# Patient Record
Sex: FEMALE | Race: BLACK OR AFRICAN AMERICAN | Employment: OTHER | URBAN - METROPOLITAN AREA
[De-identification: names, ages, dates, MRNs, and addresses within clinical notes are randomized per-mention and may not be internally consistent; named-entity substitution may affect disease eponyms.]

---

## 2020-11-06 ENCOUNTER — OFFICE VISIT (OUTPATIENT)
Dept: ENDOCRINOLOGY | Age: 65
End: 2020-11-06
Payer: MEDICARE

## 2020-11-06 VITALS
RESPIRATION RATE: 18 BRPM | HEART RATE: 73 BPM | SYSTOLIC BLOOD PRESSURE: 119 MMHG | HEIGHT: 69 IN | TEMPERATURE: 98.2 F | BODY MASS INDEX: 29.68 KG/M2 | OXYGEN SATURATION: 98 % | WEIGHT: 200.4 LBS | DIASTOLIC BLOOD PRESSURE: 82 MMHG

## 2020-11-06 DIAGNOSIS — E04.2 MULTINODULAR GOITER: ICD-10-CM

## 2020-11-06 DIAGNOSIS — E06.3 HYPOTHYROIDISM DUE TO HASHIMOTO'S THYROIDITIS: ICD-10-CM

## 2020-11-06 DIAGNOSIS — I10 ESSENTIAL HYPERTENSION: ICD-10-CM

## 2020-11-06 DIAGNOSIS — E03.8 HYPOTHYROIDISM DUE TO HASHIMOTO'S THYROIDITIS: ICD-10-CM

## 2020-11-06 DIAGNOSIS — E11.65 UNCONTROLLED TYPE 2 DIABETES MELLITUS WITH HYPERGLYCEMIA (HCC): Primary | ICD-10-CM

## 2020-11-06 PROCEDURE — 1101F PT FALLS ASSESS-DOCD LE1/YR: CPT | Performed by: INTERNAL MEDICINE

## 2020-11-06 PROCEDURE — 3046F HEMOGLOBIN A1C LEVEL >9.0%: CPT | Performed by: INTERNAL MEDICINE

## 2020-11-06 PROCEDURE — G8427 DOCREV CUR MEDS BY ELIG CLIN: HCPCS | Performed by: INTERNAL MEDICINE

## 2020-11-06 PROCEDURE — 2022F DILAT RTA XM EVC RTNOPTHY: CPT | Performed by: INTERNAL MEDICINE

## 2020-11-06 PROCEDURE — 99205 OFFICE O/P NEW HI 60 MIN: CPT | Performed by: INTERNAL MEDICINE

## 2020-11-06 PROCEDURE — G8536 NO DOC ELDER MAL SCRN: HCPCS | Performed by: INTERNAL MEDICINE

## 2020-11-06 PROCEDURE — G8400 PT W/DXA NO RESULTS DOC: HCPCS | Performed by: INTERNAL MEDICINE

## 2020-11-06 PROCEDURE — 1090F PRES/ABSN URINE INCON ASSESS: CPT | Performed by: INTERNAL MEDICINE

## 2020-11-06 PROCEDURE — G8510 SCR DEP NEG, NO PLAN REQD: HCPCS | Performed by: INTERNAL MEDICINE

## 2020-11-06 PROCEDURE — 3017F COLORECTAL CA SCREEN DOC REV: CPT | Performed by: INTERNAL MEDICINE

## 2020-11-06 PROCEDURE — G8419 CALC BMI OUT NRM PARAM NOF/U: HCPCS | Performed by: INTERNAL MEDICINE

## 2020-11-06 RX ORDER — METOPROLOL SUCCINATE 100 MG/1
TABLET, EXTENDED RELEASE ORAL DAILY
COMMUNITY

## 2020-11-06 RX ORDER — DILTIAZEM HYDROCHLORIDE 240 MG/1
CAPSULE, COATED, EXTENDED RELEASE ORAL DAILY
COMMUNITY

## 2020-11-06 RX ORDER — TIMOLOL MALEATE 6.8 MG/ML
1 SOLUTION/ DROPS OPHTHALMIC DAILY
COMMUNITY

## 2020-11-06 RX ORDER — PANTOPRAZOLE SODIUM 40 MG/1
40 TABLET, DELAYED RELEASE ORAL DAILY
COMMUNITY

## 2020-11-06 RX ORDER — LOSARTAN POTASSIUM AND HYDROCHLOROTHIAZIDE 12.5; 1 MG/1; MG/1
1 TABLET ORAL DAILY
COMMUNITY

## 2020-11-06 RX ORDER — LATANOPROST 50 UG/ML
1 SOLUTION/ DROPS OPHTHALMIC
COMMUNITY

## 2020-11-06 RX ORDER — FLUTICASONE PROPIONATE 50 MCG
2 SPRAY, SUSPENSION (ML) NASAL DAILY
COMMUNITY

## 2020-11-06 RX ORDER — ROSUVASTATIN CALCIUM 10 MG/1
10 TABLET, COATED ORAL
COMMUNITY

## 2020-11-06 RX ORDER — LEVOTHYROXINE SODIUM 75 UG/1
TABLET ORAL
Qty: 30 TAB | Refills: 6 | Status: SHIPPED | OUTPATIENT
Start: 2020-11-06 | End: 2021-03-09 | Stop reason: SDUPTHER

## 2020-11-06 RX ORDER — LEVOCETIRIZINE DIHYDROCHLORIDE 5 MG/1
TABLET, FILM COATED ORAL
COMMUNITY

## 2020-11-06 RX ORDER — METFORMIN HYDROCHLORIDE 1000 MG/1
1000 TABLET ORAL DAILY
COMMUNITY

## 2020-11-06 NOTE — LETTER
11/8/20 Patient: Radames Gilliam YOB: 1955 Date of Visit: 11/6/2020 Robert Barrios MD 
[address] [place] [address] VIA In Basket Dear Robert Barrios MD, Thank you for referring Ms. Robert Ortiz to [id number] [place] [address] for evaluation. My notes for this consultation are attached. If you have questions, please do not hesitate to call me. I look forward to following your patient along with you. Sincerely, Robbin Zuleta MD

## 2020-11-06 NOTE — PROGRESS NOTES
Care Diabetes [facility] Endocrinology  Lety Menchaca MD, Rachna Whitman is a 76 y.o. female presents today as a new DM Patient for MNg      HPI    Patient here for initial visit of Type 2 diabetes mellitus   And     Hypothyroidism   Referred : by Dr Rae Zaragoza     H/o diabetes for 10  years     Current A1C is <7 %  and symptoms/problems include none     Current diabetic medications include metformin   Patient has always been that conscious, and exercising by which she is kept her diabetes in very good control. She got the log and the sugars are good    She has had multinodular goiter for several years and she had seen that it was growing and Dr. Sakina Zaragoza had her go for biopsy which turned out to be negative for cancer    Patient denies any discomfort or dysphagia from goiter        No past medical history on file. No past surgical history on file. Current Outpatient Medications   Medication Sig    glucose blood VI test strips (OneTouch Ultra Blue Test Strip) strip OneTouch Ultra Blue Test Strip   USE AS DIRECTED (E11.9) ONCE DAILY IN VITRO 90 DAYS    pantoprazole (PROTONIX) 40 mg tablet Take 40 mg by mouth daily.  rosuvastatin (Crestor) 10 mg tablet Take 10 mg by mouth nightly.  dilTIAZem ER (Cartia XT) 240 mg capsule Take  by mouth daily.  losartan-hydroCHLOROthiazide (HYZAAR) 100-12.5 mg per tablet Take 1 Tab by mouth daily.  levocetirizine (Xyzal) 5 mg tablet Take  by mouth.  metoprolol succinate (TOPROL-XL) 100 mg tablet Take  by mouth daily.  latanoprost (XALATAN) 0.005 % ophthalmic solution Administer 1 Drop to both eyes nightly.  timoloL maleate 0.5 % drpd ophthalmic solution Administer 1 Drop to both eyes daily.  multivitamin, tx-iron-ca-min (THERA-M w/ IRON) 9 mg iron-400 mcg tab tablet Take 1 Tab by mouth daily.  fluticasone propionate (FLONASE) 50 mcg/actuation nasal spray 2 Sprays by Both Nostrils route daily.      No current facility-administered medications for this visit. Review of Systems   Constitutional: Negative. HENT: Negative. Eyes: Negative. Respiratory: Negative. Cardiovascular: Negative. Gastrointestinal: Negative. Genitourinary: Negative. Musculoskeletal: Negative. Skin: Negative. Neurological: Negative. Endo/Heme/Allergies: Negative. Psychiatric/Behavioral: Negative. Vitals:    11/06/20 1124   BP: 119/82   BP 1 Location: Right arm   BP Patient Position: Sitting   Pulse: 73   Resp: 18   Temp: 98.2 °F (36.8 °C)   TempSrc: Oral   SpO2: 98%   Weight: 200 lb 6.4 oz (90.9 kg)   Height: 5' 9\" (1.753 m)        Physical Exam  Constitutional:       Appearance: She is well-developed. HENT:      Head: Normocephalic and atraumatic. Eyes:      Conjunctiva/sclera: Conjunctivae normal.      Pupils: Pupils are equal, round, and reactive to light. Neck:      Musculoskeletal: Normal range of motion and neck supple. Comments: Thyromegaly present   Cardiovascular:      Rate and Rhythm: Normal rate and regular rhythm. Pulmonary:      Effort: Pulmonary effort is normal.      Breath sounds: Normal breath sounds. Abdominal:      General: Bowel sounds are normal.      Palpations: Abdomen is soft. Musculoskeletal: Normal range of motion. Skin:     General: Skin is warm and dry. Comments: Has acanthosis on neck and under arms   Neurological:      Mental Status: She is alert and oriented to person, place, and time. Deep Tendon Reflexes: Reflexes are normal and symmetric. [x] Recent labs have been reviewed from referring provider. [] Recent labs were not available at time of visit. 1. Type 2 DM uncontrolled :  a1c is in always good range from 6.1% to 6.2%    Reviewed the glucose log : good range   She will stay on Metformin  Patient is advised to check blood sugars 1 times daily by rotation method.   reviewed medications and side effects in detail  lab results and schedule of future lab studies reviewed with patient      2. Hypoglycemia :  Educated on treating the hypoglycemia. 3. HTN : continue diltiazem, toprol xl  And  hyzaar . Patient is educated about importance of compliance with anti-hypertensives especially ARB/ACEI    4. Dyslipidemia : continue crestor . Patient is educated about benefits and adverse effects of statins and explained how benefits outweigh risk. 5. use of aspirin to prevent MI and TIA's discussed      6. Sleep apnea - on cpap       7. H/o sciatica - not on meds       7. Hypothyroidism  : On 75 mcg  Synthroid   Euthyroid      8.  MNG  - march ultrasound 2020 showed presence of  Isthmus nodule  -  3.9   Cmm  By 2.6 cm by 1.4 cm; has grown, but could be technical and this was compared to October 18, 2019    Right inf pole nodule - 1 cm by 0.7 cm unchanged in size moderately suspicious  Right mid  Nodule measures 2.6 cm by 2.1 cm by 1.9 cm and it increased in size significantly as the prior exam it measured 1.7 cm x 1.7 cm x 1.8 cm      left upper pole nodule 1cm by 0.8 cm by 0.7 cm       November 2019 the FNA is found to be benign from isthmic thyroid nodule  May 2020, the FNA is  negative for cancer from right thyroid nodule        -So the goiter requires supervision as patient is asymptomatic at this time    > 50 % of time is spent on counseling   Patient voiced understanding her plan of care

## 2020-11-06 NOTE — PROGRESS NOTES
Wt Readings from Last 3 Encounters:   11/06/20 200 lb 6.4 oz (90.9 kg)     Temp Readings from Last 3 Encounters:   11/06/20 98.2 °F (36.8 °C) (Oral)     BP Readings from Last 3 Encounters:   11/06/20 119/82     Pulse Readings from Last 3 Encounters:   11/06/20 73     No results found for: HBA1C, HGBE8, PLE2OZSR, FDE2ZLGR

## 2020-11-06 NOTE — PATIENT INSTRUCTIONS
[facility] water Do not skip meals Do not eat in between meals Reduce carbs- pasta, rice, potatoes, bread Do not drink juices or sodas, even they are calorie zero or diet drinks Do not eat peanut butter Do not eat sugar free cookies and cakes Do not eat peaches, oranges, pineapples, raisins, grapes , canteloupe , honey dew and fruit medleys 
 
-------------------------------------------------------------------------------- Stay on metformin  1 gm one pill  twice a day with meals  
 
 
-------------------------------------------------------------------------- Synthroid 75  mcg  A day, on empty stomach with water only, no other meds or food or drinks   For next half hour Take any kind of vitamins, calcium, iron   Pills  4 hours later

## 2021-03-03 LAB
ALBUMIN SERPL-MCNC: 3.8 G/DL (ref 3.8–4.8)
ALBUMIN/CREAT UR: 6 MG/G CREAT (ref 0–29)
ALBUMIN/GLOB SERPL: 1.3 {RATIO} (ref 1.2–2.2)
ALP SERPL-CCNC: 93 IU/L (ref 39–117)
ALT SERPL-CCNC: 17 IU/L (ref 0–32)
AST SERPL-CCNC: 13 IU/L (ref 0–40)
BILIRUB SERPL-MCNC: 0.4 MG/DL (ref 0–1.2)
BUN SERPL-MCNC: 11 MG/DL (ref 8–27)
BUN/CREAT SERPL: 17 (ref 12–28)
CALCIUM SERPL-MCNC: 9.3 MG/DL (ref 8.7–10.3)
CHLORIDE SERPL-SCNC: 103 MMOL/L (ref 96–106)
CHOLEST SERPL-MCNC: 134 MG/DL (ref 100–199)
CO2 SERPL-SCNC: 27 MMOL/L (ref 20–29)
CREAT SERPL-MCNC: 0.66 MG/DL (ref 0.57–1)
CREAT UR-MCNC: 99.7 MG/DL
EST. AVERAGE GLUCOSE BLD GHB EST-MCNC: 134 MG/DL
GLOBULIN SER CALC-MCNC: 3 G/DL (ref 1.5–4.5)
GLUCOSE SERPL-MCNC: 93 MG/DL (ref 65–99)
HBA1C MFR BLD: 6.3 % (ref 4.8–5.6)
HDLC SERPL-MCNC: 49 MG/DL
IMP & REVIEW OF LAB RESULTS: NORMAL
LDLC SERPL CALC-MCNC: 69 MG/DL (ref 0–99)
Lab: NORMAL
MICROALBUMIN UR-MCNC: 5.9 UG/ML
POTASSIUM SERPL-SCNC: 4.3 MMOL/L (ref 3.5–5.2)
PROT SERPL-MCNC: 6.8 G/DL (ref 6–8.5)
SODIUM SERPL-SCNC: 142 MMOL/L (ref 134–144)
TRIGL SERPL-MCNC: 80 MG/DL (ref 0–149)
TSH SERPL DL<=0.005 MIU/L-ACNC: 0.73 UIU/ML (ref 0.45–4.5)
VLDLC SERPL CALC-MCNC: 16 MG/DL (ref 5–40)

## 2021-03-09 ENCOUNTER — OFFICE VISIT (OUTPATIENT)
Dept: ENDOCRINOLOGY | Age: 66
End: 2021-03-09
Payer: MEDICARE

## 2021-03-09 VITALS
TEMPERATURE: 98.2 F | WEIGHT: 200 LBS | SYSTOLIC BLOOD PRESSURE: 139 MMHG | RESPIRATION RATE: 18 BRPM | HEART RATE: 69 BPM | OXYGEN SATURATION: 95 % | BODY MASS INDEX: 29.53 KG/M2 | DIASTOLIC BLOOD PRESSURE: 80 MMHG

## 2021-03-09 DIAGNOSIS — E03.8 HYPOTHYROIDISM DUE TO HASHIMOTO'S THYROIDITIS: ICD-10-CM

## 2021-03-09 DIAGNOSIS — E04.2 MULTINODULAR GOITER: Primary | ICD-10-CM

## 2021-03-09 DIAGNOSIS — E11.65 UNCONTROLLED TYPE 2 DIABETES MELLITUS WITH HYPERGLYCEMIA (HCC): ICD-10-CM

## 2021-03-09 DIAGNOSIS — E06.3 HYPOTHYROIDISM DUE TO HASHIMOTO'S THYROIDITIS: ICD-10-CM

## 2021-03-09 PROCEDURE — G8536 NO DOC ELDER MAL SCRN: HCPCS | Performed by: INTERNAL MEDICINE

## 2021-03-09 PROCEDURE — 1101F PT FALLS ASSESS-DOCD LE1/YR: CPT | Performed by: INTERNAL MEDICINE

## 2021-03-09 PROCEDURE — 3044F HG A1C LEVEL LT 7.0%: CPT | Performed by: INTERNAL MEDICINE

## 2021-03-09 PROCEDURE — G8400 PT W/DXA NO RESULTS DOC: HCPCS | Performed by: INTERNAL MEDICINE

## 2021-03-09 PROCEDURE — 2022F DILAT RTA XM EVC RTNOPTHY: CPT | Performed by: INTERNAL MEDICINE

## 2021-03-09 PROCEDURE — 3017F COLORECTAL CA SCREEN DOC REV: CPT | Performed by: INTERNAL MEDICINE

## 2021-03-09 PROCEDURE — G8419 CALC BMI OUT NRM PARAM NOF/U: HCPCS | Performed by: INTERNAL MEDICINE

## 2021-03-09 PROCEDURE — G8427 DOCREV CUR MEDS BY ELIG CLIN: HCPCS | Performed by: INTERNAL MEDICINE

## 2021-03-09 PROCEDURE — 1090F PRES/ABSN URINE INCON ASSESS: CPT | Performed by: INTERNAL MEDICINE

## 2021-03-09 PROCEDURE — 99214 OFFICE O/P EST MOD 30 MIN: CPT | Performed by: INTERNAL MEDICINE

## 2021-03-09 PROCEDURE — G8510 SCR DEP NEG, NO PLAN REQD: HCPCS | Performed by: INTERNAL MEDICINE

## 2021-03-09 RX ORDER — LEVOTHYROXINE SODIUM 75 UG/1
TABLET ORAL
Qty: 90 TAB | Refills: 3 | Status: SHIPPED | OUTPATIENT
Start: 2021-03-09 | End: 2021-10-11 | Stop reason: SDUPTHER

## 2021-03-09 RX ORDER — GUAIFENESIN 100 MG/5ML
81 LIQUID (ML) ORAL DAILY
COMMUNITY

## 2021-03-09 RX ORDER — LANOLIN ALCOHOL/MO/W.PET/CERES
1000 CREAM (GRAM) TOPICAL DAILY
COMMUNITY

## 2021-03-09 RX ORDER — ASCORBIC ACID 500 MG
TABLET ORAL
COMMUNITY

## 2021-03-09 NOTE — LETTER
3/9/2021 Patient: Luis Enrique Rosales YOB: 1955 Date of Visit: 3/9/2021 Bruce Caceres MD 
[facility] [address] [facility] [address] Via Fax: [phone] Dear Bruce Caceres MD, Thank you for referring Ms. Caren Wyatt to [address] [address] for evaluation. My notes for this consultation are attached. If you have questions, please do not hesitate to call me. I look forward to following your patient along with you. Sincerely, Marcial Barajas MD

## 2021-03-09 NOTE — PROGRESS NOTES
1. Have you been to the ER, urgent care clinic since your last visit? [facility]-February, 2021-Covid symptoms  Hospitalized since your last visit? No    2. Have you seen or consulted any other health care providers outside of the [address] [place] [address] since your last visit? Include any pap smears or colon screening.  Yes When: [facility]-February 25, 2021-Covid re check     Wt Readings from Last 3 Encounters:   03/09/21 200 lb (90.7 kg)   11/06/20 200 lb 6.4 oz (90.9 kg)     Temp Readings from Last 3 Encounters:   03/09/21 98.2 °F (36.8 °C)   11/06/20 98.2 °F (36.8 °C) (Oral)     BP Readings from Last 3 Encounters:   03/09/21 139/80   11/06/20 119/82     Pulse Readings from Last 3 Encounters:   03/09/21 69   11/06/20 73     Lab Results   Component Value Date/Time    Hemoglobin A1c 6.3 (H) 03/01/2021 07:42 AM

## 2021-03-09 NOTE — PROGRESS NOTES
Care Diabetes and Endocrinology  Greta Price MD, Christie Garza is a 76 y.o. female presents today as a new DM Patient for MNg      HPI    Patient here for  FIRST FOLLOW UP AFTER  initial visit of Type 2 diabetes mellitus   And     Hypothyroidism   From November 2020       pcp has decreased metformin to one pill a day         Nov 2020   Referred : by Dr Ayaan Talbert   H/o diabetes for 10  years   Current A1C is <7 %  and symptoms/problems include none   Current diabetic medications include metformin   Patient has always been that conscious, and exercising by which she is kept her diabetes in very good control. She got the log and the sugars are good  She has had multinodular goiter for several years and she had seen that it was growing and Dr. Malena Talbert had her go for biopsy which turned out to be negative for cancer  Patient denies any discomfort or dysphagia from goiter      Vitals:    03/09/21 0903   BP: 139/80   Pulse: 69   Resp: 18   Temp: 98.2 °F (36.8 °C)   SpO2: 95%   Weight: 200 lb (90.7 kg)          Review of Systems   None       Physical Exam   Constitutional: She is oriented to person, place, and time. She appears well-developed and well-nourished. Neck:  thyromegaly present. Cardiovascular: Normal rate, regular rhythm   Pulmonary/Chest: Breath sounds normal.   Psychiatric: She has a normal mood and affect. LABS :   Lab Results   Component Value Date/Time    Hemoglobin A1c 6.3 (H) 03/01/2021 07:42 AM    Glucose 93 03/01/2021 07:42 AM    Microalb/Creat ratio (ug/mg creat.) 6 03/01/2021 07:42 AM    LDL, calculated 69 03/01/2021 07:42 AM    Creatinine 0.66 03/01/2021 07:42 AM      Lab Results   Component Value Date/Time    Cholesterol, total 134 03/01/2021 07:42 AM    HDL Cholesterol 49 03/01/2021 07:42 AM    LDL, calculated 69 03/01/2021 07:42 AM    Triglyceride 80 03/01/2021 07:42 AM     Lab Results   Component Value Date/Time    ALT (SGPT) 17 03/01/2021 07:42 AM    Alk. phosphatase 93 03/01/2021 07:42 AM    Bilirubin, total 0.4 03/01/2021 07:42 AM    Albumin 3.8 03/01/2021 07:42 AM    Protein, total 6.8 03/01/2021 07:42 AM     Lab Results   Component Value Date/Time    GFR est non-AA 92 03/01/2021 07:42 AM    GFR est  03/01/2021 07:42 AM    Creatinine 0.66 03/01/2021 07:42 AM    BUN 11 03/01/2021 07:42 AM    Sodium 142 03/01/2021 07:42 AM    Potassium 4.3 03/01/2021 07:42 AM    Chloride 103 03/01/2021 07:42 AM    CO2 27 03/01/2021 07:42 AM           Assessment and plan     1. Type 2 DM uncontrolled :  a1c is  6.3 %    FROM [facility] 2021   COMPARED TO IN THE PAST, SHE ALWAYS HAD  good range OF A1C  - 6.1% to 6.2%  BUT SHE IS ON  Metformin    METFORMIN being adjusted by pcp. Patient is advised to check blood sugars 1 times daily by rotation method. 2. HTN : controlled,  continue diltiazem, toprol xl  And  hyzaar . 3. Dyslipidemia : continue crestor     4. hypothyroidism  : On 75 mcg  Synthroid   Euthyroid       5.  MNG  - march ultrasound 2020 :   Isthmus nodule  -  3.9   Cm  By 2.6 cm by 1.4 cm; has grown, but could be technical and this was compared to October 18, 2019    Right inf pole nodule - 1 cm by 0.7 cm unchanged in size moderately suspicious  Right mid  Nodule measures 2.6 cm by 2.1 cm by 1.9 cm and it increased in size significantly as the prior exam it measured 1.7 cm x 1.7 cm x 1.8 cm .  left upper pole nodule 1cm by 0.8 cm by 0.7 cm       November 2019 the FNA is found to be benign from isthmic thyroid nodule  May 2020, the FNA is  negative for cancer from right thyroid nodule      -So the goiter requires supervision as patient is asymptomatic at this time  March 2021  - ordering the ultrasound         Reviewed results with patient and discussed the labs being ordered today/bnv  Patient voiced understanding of plan of care

## 2021-03-09 NOTE — PATIENT INSTRUCTIONS
SPECIFIC INSTRUCTIONS BELOW       Synthroid 75  mcg  A day, on empty stomach with water only, no other meds or food or drinks   For next half hour     Take any kind of vitamins, calcium, iron   Pills  4 hours later            PAY ATTENTION TO THESE GENERAL INSTRUCTIONS     - HEALTH MAINTENANCE IS NOT GOING TO BE UP TO DATE ON YOUR AVS- PLEASE IGNORE   - YOUR MED LIST IS NOT UP TO DATE AS SOME CHANGES ARE BEING MADE AFTER THE VISIT - FOLLOW SPECIFIC INSTRUCTIONS ABOVE     Results     *Normal results will not be notified by a phone call starting January 1 2021   *If you have an upcoming visit, the results will be discussed at the visit   *Please sign up for MY CHART if you want access to your lab and test results  *Abnormal results which require immediate attention will be notified by phone call   *Abnormal results which do not require immediate assistance will be notified in 1-2 weeks       Refills    -    have your pharmacy send us a refill request  Phone calls  -  Allow  24 hrs.  for non-urgent calls to be returned  Prior authorization - It may take 2-4 weeks to process  Forms  -  FMLA, DMV etc., will take up to 2 weeks to process  Cancellations - please notify the office 2 days in advance   Samples  - will only be dispensed at visits     --------------------------------------------------------------------------------------------

## 2021-03-18 ENCOUNTER — HOSPITAL ENCOUNTER (OUTPATIENT)
Dept: ULTRASOUND IMAGING | Age: 66
Discharge: HOME | End: 2021-03-18
Attending: INTERNAL MEDICINE
Payer: MEDICARE

## 2021-03-18 DIAGNOSIS — E04.2 MULTINODULAR GOITER: ICD-10-CM

## 2021-03-18 PROCEDURE — 76536 US EXAM OF HEAD AND NECK: CPT

## 2021-03-22 NOTE — PROGRESS NOTES
This is the baseline ultrasound done  at [facility] .  Will follow - meaning , will order ultrasound in 6 months to one year for follow up on growth

## 2021-05-11 ENCOUNTER — HOSPITAL ENCOUNTER (OUTPATIENT)
Dept: GENERAL RADIOLOGY | Age: 66
Discharge: HOME | End: 2021-05-11
Payer: MEDICARE

## 2021-05-11 ENCOUNTER — TRANSCRIBE ORDER (OUTPATIENT)
Dept: REGISTRATION | Age: 66
End: 2021-05-11

## 2021-05-11 DIAGNOSIS — R06.02 SHORTNESS OF BREATH: Primary | ICD-10-CM

## 2021-05-11 DIAGNOSIS — R06.02 SHORTNESS OF BREATH: ICD-10-CM

## 2021-05-11 PROCEDURE — 71046 X-RAY EXAM CHEST 2 VIEWS: CPT

## 2021-09-25 LAB — TSH SERPL DL<=0.005 MIU/L-ACNC: 0.48 UIU/ML (ref 0.45–4.5)

## 2021-10-11 ENCOUNTER — OFFICE VISIT (OUTPATIENT)
Dept: ENDOCRINOLOGY | Age: 66
End: 2021-10-11
Payer: MEDICARE

## 2021-10-11 VITALS
BODY MASS INDEX: 31.07 KG/M2 | TEMPERATURE: 97.2 F | OXYGEN SATURATION: 96 % | HEART RATE: 60 BPM | SYSTOLIC BLOOD PRESSURE: 141 MMHG | WEIGHT: 209.8 LBS | RESPIRATION RATE: 18 BRPM | DIASTOLIC BLOOD PRESSURE: 65 MMHG | HEIGHT: 69 IN

## 2021-10-11 DIAGNOSIS — E03.8 HYPOTHYROIDISM DUE TO HASHIMOTO'S THYROIDITIS: Primary | ICD-10-CM

## 2021-10-11 DIAGNOSIS — E04.2 MULTINODULAR GOITER: ICD-10-CM

## 2021-10-11 DIAGNOSIS — E11.65 UNCONTROLLED TYPE 2 DIABETES MELLITUS WITH HYPERGLYCEMIA (HCC): ICD-10-CM

## 2021-10-11 DIAGNOSIS — E06.3 HYPOTHYROIDISM DUE TO HASHIMOTO'S THYROIDITIS: Primary | ICD-10-CM

## 2021-10-11 LAB — HBA1C MFR BLD HPLC: 5.9 %

## 2021-10-11 PROCEDURE — G8427 DOCREV CUR MEDS BY ELIG CLIN: HCPCS | Performed by: INTERNAL MEDICINE

## 2021-10-11 PROCEDURE — G8400 PT W/DXA NO RESULTS DOC: HCPCS | Performed by: INTERNAL MEDICINE

## 2021-10-11 PROCEDURE — 99214 OFFICE O/P EST MOD 30 MIN: CPT | Performed by: INTERNAL MEDICINE

## 2021-10-11 PROCEDURE — 1090F PRES/ABSN URINE INCON ASSESS: CPT | Performed by: INTERNAL MEDICINE

## 2021-10-11 PROCEDURE — 83036 HEMOGLOBIN GLYCOSYLATED A1C: CPT | Performed by: INTERNAL MEDICINE

## 2021-10-11 PROCEDURE — G8417 CALC BMI ABV UP PARAM F/U: HCPCS | Performed by: INTERNAL MEDICINE

## 2021-10-11 PROCEDURE — 1101F PT FALLS ASSESS-DOCD LE1/YR: CPT | Performed by: INTERNAL MEDICINE

## 2021-10-11 PROCEDURE — 2022F DILAT RTA XM EVC RTNOPTHY: CPT | Performed by: INTERNAL MEDICINE

## 2021-10-11 PROCEDURE — 3044F HG A1C LEVEL LT 7.0%: CPT | Performed by: INTERNAL MEDICINE

## 2021-10-11 PROCEDURE — G8536 NO DOC ELDER MAL SCRN: HCPCS | Performed by: INTERNAL MEDICINE

## 2021-10-11 PROCEDURE — G8510 SCR DEP NEG, NO PLAN REQD: HCPCS | Performed by: INTERNAL MEDICINE

## 2021-10-11 PROCEDURE — 3017F COLORECTAL CA SCREEN DOC REV: CPT | Performed by: INTERNAL MEDICINE

## 2021-10-11 RX ORDER — LEVOTHYROXINE SODIUM 75 UG/1
TABLET ORAL
Qty: 90 TABLET | Refills: 3 | Status: SHIPPED | OUTPATIENT
Start: 2021-10-11 | End: 2022-10-10 | Stop reason: SDUPTHER

## 2021-10-11 RX ORDER — TURMERIC 400 MG
1 CAPSULE ORAL DAILY
COMMUNITY

## 2021-10-11 RX ORDER — FLUTICASONE PROPIONATE AND SALMETEROL 250; 50 UG/1; UG/1
POWDER RESPIRATORY (INHALATION)
COMMUNITY
Start: 2021-07-13

## 2021-10-11 NOTE — PROGRESS NOTES
Care Diabetes and Endocrinology  [facility] Current  MD, Mishel Al is a 78 y.o. female presents today as a follow up        Patient here for  AFTER  Last  visit  for Type 2 diabetes mellitus   And     Hypothyroidism   And MNG  From [facility] 2021       Compliant with meds   Has no symptoms           March 2021     pcp has decreased metformin to one pill a day         Nov 2020   Referred : by Dr Arnel Mcneal   H/o diabetes for 10  years   Current A1C is <7 %  and symptoms/problems include none   Current diabetic medications include metformin   Patient has always been that conscious, and exercising by which she is kept her diabetes in very good control. She got the log and the sugars are good  She has had multinodular goiter for several years and she had seen that it was growing and Dr. Elin Mcneal had her go for biopsy which turned out to be negative for cancer  Patient denies any discomfort or dysphagia from goiter      Vitals:    10/11/21 0845   BP: (!) 141/65   BP 1 Location: Left upper arm   BP Patient Position: Sitting   BP Cuff Size: Large adult   Pulse: 60   Temp: 97.2 °F (36.2 °C)   TempSrc: Temporal   Resp: 18   Height: 5' 9\" (1.753 m)   Weight: 209 lb 12.8 oz (95.2 kg)   SpO2: 96%          Review of Systems   None       Physical Exam   Constitutional: She is oriented to person, place, and time. She appears well-developed and well-nourished. Neck:  thyromegaly present. Psychiatric: She has a normal mood and affect.            LABS :   Lab Results   Component Value Date/Time    Hemoglobin A1c 6.3 (H) 03/01/2021 07:42 AM    Glucose 93 03/01/2021 07:42 AM    Microalb/Creat ratio (ug/mg creat.) 6 03/01/2021 07:42 AM    LDL, calculated 69 03/01/2021 07:42 AM    Creatinine 0.66 03/01/2021 07:42 AM      Lab Results   Component Value Date/Time    Cholesterol, total 134 03/01/2021 07:42 AM    HDL Cholesterol 49 03/01/2021 07:42 AM    LDL, calculated 69 03/01/2021 07:42 AM    Triglyceride 80 03/01/2021 07:42 AM Lab Results   Component Value Date/Time    ALT (SGPT) 17 03/01/2021 07:42 AM    Alk. phosphatase 93 03/01/2021 07:42 AM    Bilirubin, total 0.4 03/01/2021 07:42 AM    Albumin 3.8 03/01/2021 07:42 AM    Protein, total 6.8 03/01/2021 07:42 AM     Lab Results   Component Value Date/Time    GFR est non-AA 92 03/01/2021 07:42 AM    GFR est  03/01/2021 07:42 AM    Creatinine 0.66 03/01/2021 07:42 AM    BUN 11 03/01/2021 07:42 AM    Sodium 142 03/01/2021 07:42 AM    Potassium 4.3 03/01/2021 07:42 AM    Chloride 103 03/01/2021 07:42 AM    CO2 27 03/01/2021 07:42 AM           Assessment and plan     1.  hypothyroidism  : On 75 mcg  Synthroid   Euthyroid - sept 2021       2. MNG  - march ultrasound 2020 :   Isthmus nodule  -  3.9   Cm  By 2.6 cm by 1.4 cm; has grown, but could be technical and this was compared to October 18, 2019    Right inf pole nodule - 1 cm by 0.7 cm unchanged in size moderately suspicious  Right mid  Nodule measures 2.6 cm by 2.1 cm by 1.9 cm and it increased in size significantly as the prior exam it measured 1.7 cm x 1.7 cm x 1.8 cm .  left upper pole nodule 1cm by 0.8 cm by 0.7 cm       November 2019 the FNA is found to be benign from isthmic thyroid nodule  May 2020, the FNA is  negative for cancer from right thyroid nodule      -So the goiter requires supervision as patient is asymptomatic at this time  March 2021  -  ultrasound  : Right lobe hypoechoic partially indistinct nodule 1.8 x  1.8 x 1.8 cm. TI-RADS  Category 4. Right lobe hypoechoic nodule 1.0 x 0.7 x 0.6 cm. TI-RADS Category 4. Isthmus heterogeneous hypoechoic and isoechoic nodule 3.1 x 1.5 x 2.9  cm.  TI-RADS Category 3-4.      3. Type 2 DM uncontrolled :  a1c is   5.9 %    From  Today by POC oct 2021       Compared to   6.3 %    FROM [facility] 2021   COMPARED TO IN THE PAST, SHE ALWAYS HAD  good range OF A1C  - 6.1% to 6.2%  BUT SHE IS ON  Metformin    METFORMIN being adjusted by pcp.   Patient is advised to check blood sugars 1 times daily by rotation method. 4. HTN : controlled,  continue diltiazem, toprol xl  And  hyzaar . 5. Dyslipidemia : continue crestor         6.  Cad - she f/u with Dr. Madeline Alcaraz  -   On diltiazem and hyzaar and toprol xl       F/u in 1 year       Reviewed results with patient and discussed the labs being ordered today/bnv  Patient voiced understanding of plan of care

## 2021-10-11 NOTE — LETTER
10/11/2021    Patient: Clau Tenorio   YOB: 1955   Date of Visit: 10/11/2021     [facility]ick William MD  [facility]  [address] [address]  Via Fax: [phone]    Dear Vick William MD,      Thank you for referring Ms. Hari Lamb to [address] [place] [address] for evaluation. My notes for this consultation are attached. If you have questions, please do not hesitate to call me. I look forward to following your patient along with you.       Sincerely,    Elly Isaacs MD

## 2021-10-11 NOTE — PATIENT INSTRUCTIONS
SPECIFIC INSTRUCTIONS BELOW       Synthroid 75  mcg  A day, on empty stomach with water only, no other meds or food or drinks   For next half hour     Take any kind of vitamins, calcium, iron   Pills  4 hours later      -------------PAY ATTENTION TO THESE GENERAL INSTRUCTIONS -----------------      - The medications prescribed at this visit will not be available at pharmacy until 6 pm       - YOUR MED LIST IS NOT UP TO DATE AS SOME CHANGES ARE BEING MADE AFTER THE VISIT - FOLLOW SPECIFIC INSTRUCTIONS  ABOVE     -ANY tests other than blood work, which you opt to do  outside the  [facility] facilities, you are responsible for prior authorizations if  required    - [address] UP TO DATE ON YOUR AVS- PLEASE IGNORE     Results     *Normal results will not be notified by a phone call starting January 1 2021   *If you have an upcoming visit, the results will be discussed at the visit   *Please sign up for MY CHART if you want access to your lab and test results  *Abnormal results which require immediate attention will be notified by phone call   *Abnormal results which do not require immediate assistance will be notified in 1-2 weeks       Refills    -    have your pharmacy send us a refill request . Refills are done max for one year and a visit is a must before refills are extended    Follow up appointments -  highly encourage you to make it when you are checking out. We can accommodate you into the schedule based on your clinical situation, but not for extending refills beyond a year. Labs are important to give refills and is important to get labs before the visit     Phone calls  -  Allow  24 hrs.  for non-urgent calls to be returned  Prior authorization - It may take 2-4 weeks to process  Forms  -  FMLA, DMV etc., will take up to 2 weeks to process  Cancellations - please notify the office 2 days in advance   Samples  - will only be dispensed at visits       If not showing for the appointments and cancelling appointments within 24 hours are kept track of and three  of such situations in  two consecutive years will likely be considered for termination from the practice    -------------------------------------------------------------------------------------------------------------------

## 2022-10-04 LAB
ALBUMIN SERPL-MCNC: 4.4 G/DL (ref 3.8–4.8)
ALBUMIN/CREAT UR: 10 MG/G CREAT (ref 0–29)
ALBUMIN/GLOB SERPL: 1.8 {RATIO} (ref 1.2–2.2)
ALP SERPL-CCNC: 105 IU/L (ref 44–121)
ALT SERPL-CCNC: 15 IU/L (ref 0–32)
AST SERPL-CCNC: 11 IU/L (ref 0–40)
BILIRUB SERPL-MCNC: 0.4 MG/DL (ref 0–1.2)
BUN SERPL-MCNC: 8 MG/DL (ref 8–27)
BUN/CREAT SERPL: 14 (ref 12–28)
CALCIUM SERPL-MCNC: 9.6 MG/DL (ref 8.7–10.3)
CHLORIDE SERPL-SCNC: 105 MMOL/L (ref 96–106)
CHOLEST SERPL-MCNC: 124 MG/DL (ref 100–199)
CO2 SERPL-SCNC: 28 MMOL/L (ref 20–29)
CREAT SERPL-MCNC: 0.56 MG/DL (ref 0.57–1)
CREAT UR-MCNC: 235.5 MG/DL
EGFR: 99 ML/MIN/1.73
EST. AVERAGE GLUCOSE BLD GHB EST-MCNC: 134 MG/DL
GLOBULIN SER CALC-MCNC: 2.5 G/DL (ref 1.5–4.5)
GLUCOSE SERPL-MCNC: 110 MG/DL (ref 70–99)
HBA1C MFR BLD: 6.3 % (ref 4.8–5.6)
HDLC SERPL-MCNC: 46 MG/DL
IMP & REVIEW OF LAB RESULTS: NORMAL
LDLC SERPL CALC-MCNC: 61 MG/DL (ref 0–99)
MICROALBUMIN UR-MCNC: 23.1 UG/ML
POTASSIUM SERPL-SCNC: 4.6 MMOL/L (ref 3.5–5.2)
PROT SERPL-MCNC: 6.9 G/DL (ref 6–8.5)
SODIUM SERPL-SCNC: 144 MMOL/L (ref 134–144)
TRIGL SERPL-MCNC: 86 MG/DL (ref 0–149)
TSH SERPL DL<=0.005 MIU/L-ACNC: 0.76 UIU/ML (ref 0.45–4.5)
VLDLC SERPL CALC-MCNC: 17 MG/DL (ref 5–40)

## 2022-10-10 ENCOUNTER — OFFICE VISIT (OUTPATIENT)
Dept: ENDOCRINOLOGY | Age: 67
End: 2022-10-10
Payer: MEDICARE

## 2022-10-10 VITALS
HEART RATE: 60 BPM | HEIGHT: 69 IN | BODY MASS INDEX: 30.98 KG/M2 | DIASTOLIC BLOOD PRESSURE: 80 MMHG | RESPIRATION RATE: 18 BRPM | SYSTOLIC BLOOD PRESSURE: 143 MMHG | OXYGEN SATURATION: 96 % | WEIGHT: 209.2 LBS | TEMPERATURE: 97.3 F

## 2022-10-10 DIAGNOSIS — E11.65 UNCONTROLLED TYPE 2 DIABETES MELLITUS WITH HYPERGLYCEMIA (HCC): Primary | ICD-10-CM

## 2022-10-10 DIAGNOSIS — E04.2 MULTINODULAR GOITER: ICD-10-CM

## 2022-10-10 DIAGNOSIS — E06.3 HYPOTHYROIDISM DUE TO HASHIMOTO'S THYROIDITIS: ICD-10-CM

## 2022-10-10 DIAGNOSIS — I10 ESSENTIAL HYPERTENSION: ICD-10-CM

## 2022-10-10 DIAGNOSIS — E03.8 HYPOTHYROIDISM DUE TO HASHIMOTO'S THYROIDITIS: ICD-10-CM

## 2022-10-10 PROCEDURE — G9899 SCRN MAM PERF RSLTS DOC: HCPCS | Performed by: INTERNAL MEDICINE

## 2022-10-10 PROCEDURE — 99214 OFFICE O/P EST MOD 30 MIN: CPT | Performed by: INTERNAL MEDICINE

## 2022-10-10 PROCEDURE — G8400 PT W/DXA NO RESULTS DOC: HCPCS | Performed by: INTERNAL MEDICINE

## 2022-10-10 PROCEDURE — G8536 NO DOC ELDER MAL SCRN: HCPCS | Performed by: INTERNAL MEDICINE

## 2022-10-10 PROCEDURE — 1101F PT FALLS ASSESS-DOCD LE1/YR: CPT | Performed by: INTERNAL MEDICINE

## 2022-10-10 PROCEDURE — 3017F COLORECTAL CA SCREEN DOC REV: CPT | Performed by: INTERNAL MEDICINE

## 2022-10-10 PROCEDURE — G8432 DEP SCR NOT DOC, RNG: HCPCS | Performed by: INTERNAL MEDICINE

## 2022-10-10 PROCEDURE — 3044F HG A1C LEVEL LT 7.0%: CPT | Performed by: INTERNAL MEDICINE

## 2022-10-10 PROCEDURE — 1090F PRES/ABSN URINE INCON ASSESS: CPT | Performed by: INTERNAL MEDICINE

## 2022-10-10 PROCEDURE — 1123F ACP DISCUSS/DSCN MKR DOCD: CPT | Performed by: INTERNAL MEDICINE

## 2022-10-10 PROCEDURE — G8417 CALC BMI ABV UP PARAM F/U: HCPCS | Performed by: INTERNAL MEDICINE

## 2022-10-10 PROCEDURE — G8427 DOCREV CUR MEDS BY ELIG CLIN: HCPCS | Performed by: INTERNAL MEDICINE

## 2022-10-10 PROCEDURE — 2022F DILAT RTA XM EVC RTNOPTHY: CPT | Performed by: INTERNAL MEDICINE

## 2022-10-10 RX ORDER — LEVOTHYROXINE SODIUM 75 UG/1
TABLET ORAL
Qty: 90 TABLET | Refills: 3 | Status: SHIPPED | OUTPATIENT
Start: 2022-10-10

## 2022-10-10 NOTE — PROGRESS NOTES
1. Have you been to the ER, urgent care clinic since your last visit? No  Hospitalized since your last visit? No    2. Have you seen or consulted any other health care providers outside of the [address] [place] [address] since your last visit? Include any pap smears or colon screening.  No    Wt Readings from Last 3 Encounters:   10/10/22 209 lb 3.2 oz (94.9 kg)   10/11/21 209 lb 12.8 oz (95.2 kg)   03/09/21 200 lb (90.7 kg)     Temp Readings from Last 3 Encounters:   10/10/22 97.3 °F (36.3 °C) (Temporal)   10/11/21 97.2 °F (36.2 °C) (Temporal)   03/09/21 98.2 °F (36.8 °C)     BP Readings from Last 3 Encounters:   10/10/22 (!) 143/80   10/11/21 (!) 141/65   03/09/21 139/80     Pulse Readings from Last 3 Encounters:   10/10/22 60   10/11/21 60   03/09/21 69     Lab Results   Component Value Date/Time    Hemoglobin A1c 6.3 (H) 10/03/2022 08:45 AM    Hemoglobin A1c (POC) 5.9 10/11/2021 09:06 AM

## 2022-10-10 NOTE — PATIENT INSTRUCTIONS
SPECIFIC INSTRUCTIONS BELOW       Ask pcp  for jardiance 10    or farxiga 5       Synthroid 75  mcg  A day, on empty stomach with water only, no other meds or food or drinks   For next half hour     Take any kind of vitamins, calcium, iron   Pills  4 hours later        -------------PAY ATTENTION TO THESE GENERAL INSTRUCTIONS -----------------      - The medications prescribed at this visit will not be available at pharmacy until 6 pm       - YOUR MED LIST IS NOT UP TO DATE AS SOME CHANGES ARE BEING MADE AFTER THE VISIT - [address] [place] [address]     -ANY tests other than blood work, which you opt to do  outside the  [facility] facilities, you are responsible for prior authorizations if  required    - 18 Franca Hankins UP TO DATE ON YOUR AVS- PLEASE IGNORE     Results     *Normal results will not be notified by a phone call starting January 1 2021   *If you have an upcoming visit, the results will be discussed at the visit   *Please sign up for MY CHART if you want access to your lab and test results  *Abnormal results which require immediate attention will be notified by phone call   *Abnormal results which do not require immediate assistance will be notified in 1-2 weeks       Refills    -    have your pharmacy send us a refill request . Refills are done max for one year and a visit is a must before refills are extended    Follow up appointments -  highly encourage you to make it when you are checking out. We can accommodate you into the schedule based on your clinical situation, but not for extending refills beyond a year. Labs are important to give refills and is important to get labs before the visit     Phone calls  -  Allow  24 hrs.  for non-urgent calls to be returned  Prior authorization - It may take 2-4 weeks to process  Forms  -  FMLA, DMV etc., will take up to 2 weeks to process  Cancellations - please notify the office 2 days in advance   Samples  - will only be dispensed at visits       If not showing for the appointments and cancelling appointments within 24 hours are kept track of and three  of such situations in  two consecutive years will likely be considered for termination from the practice    -------------------------------------------------------------------------------------------------------------------

## 2022-10-10 NOTE — LETTER
10/10/2022    Patient: Jose Angel Obrien   YOB: 1955   Date of Visit: 10/10/2022     Ruth Ann Quan MD  [address]  [place] [phone]  Via Fax: [phone]    Dear Ruth Ann Quan MD,      Thank you for referring Ms. Rajan Williamson to [address] [place] [address] for evaluation. My notes for this consultation are attached. If you have questions, please do not hesitate to call me. I look forward to following your patient along with you.       Sincerely,    Ga Leblanc MD

## 2022-10-10 NOTE — PROGRESS NOTES
[facility]  Benito Cordova MD, Bethany Gallo is a 75 y.o. female presents today as a follow up        Patient here for   one year follow up AFTER  Last  visit  for Type 2 diabetes mellitus   And     Hypothyroidism   And MNG  From October 2021       Weight is stable   She is sick with sinus infection        Oct 2021     Compliant with meds   Has no symptoms       Nov 2020   Referred : by Dr Nydia Carbone   H/o diabetes for 10  years   Current A1C is <7 %  and symptoms/problems include none   Current diabetic medications include metformin   Patient has always been that conscious, and exercising by which she is kept her diabetes in very good control. She got the log and the sugars are good  She has had multinodular goiter for several years and she had seen that it was growing and Dr. Perla Carbone had her go for biopsy which turned out to be negative for cancer  Patient denies any discomfort or dysphagia from goiter      Vitals:    10/10/22 0901   BP: (!) 143/80   BP 1 Location: Left upper arm   BP Patient Position: Sitting   BP Cuff Size: Adult   Pulse: 60   Temp: 97.3 °F (36.3 °C)   TempSrc: Temporal   Resp: 18   Height: 5' 9\" (1.753 m)   Weight: 209 lb 3.2 oz (94.9 kg)   SpO2: 96%          Review of Systems   None       Physical Exam   Constitutional: She is oriented to person, place, and time. She appears well-developed and well-nourished. Neck:  thyromegaly present. Psychiatric: She has a normal mood and affect.            LABS :   Lab Results   Component Value Date/Time    Hemoglobin A1c 6.3 (H) 10/03/2022 08:45 AM    Hemoglobin A1c 6.3 (H) 03/01/2021 07:42 AM    Glucose 110 (H) 10/03/2022 08:45 AM    Microalb/Creat ratio (ug/mg creat.) 10 10/03/2022 08:45 AM    LDL, calculated 61 10/03/2022 08:45 AM    Creatinine 0.56 (L) 10/03/2022 08:45 AM      Lab Results   Component Value Date/Time    Cholesterol, total 124 10/03/2022 08:45 AM    HDL Cholesterol 46 10/03/2022 08:45 AM    LDL, calculated 61 10/03/2022 08:45 AM    Triglyceride 86 10/03/2022 08:45 AM     Lab Results   Component Value Date/Time    ALT (SGPT) 15 10/03/2022 08:45 AM    Alk. phosphatase 105 10/03/2022 08:45 AM    Bilirubin, total 0.4 10/03/2022 08:45 AM    Albumin 4.4 10/03/2022 08:45 AM    Protein, total 6.9 10/03/2022 08:45 AM     Lab Results   Component Value Date/Time    GFR est non-AA 92 03/01/2021 07:42 AM    GFR est  03/01/2021 07:42 AM    Creatinine 0.56 (L) 10/03/2022 08:45 AM    BUN 8 10/03/2022 08:45 AM    Sodium 144 10/03/2022 08:45 AM    Potassium 4.6 10/03/2022 08:45 AM    Chloride 105 10/03/2022 08:45 AM    CO2 28 10/03/2022 08:45 AM           Assessment and plan     1.  hypothyroidism  : On 75 mcg  Synthroid   Euthyroid - sept 2022      2. MNG  - march ultrasound 2020 :   Isthmus nodule  -  3.9   Cm  By 2.6 cm by 1.4 cm; has grown, but could be technical and this was compared to October 18, 2019    Right inf pole nodule - 1 cm by 0.7 cm unchanged in size moderately suspicious  Right mid  Nodule measures 2.6 cm by 2.1 cm by 1.9 cm and it increased in size significantly as the prior exam it measured 1.7 cm x 1.7 cm x 1.8 cm .  left upper pole nodule 1cm by 0.8 cm by 0.7 cm       November 2019 the FNA is found to be benign from isthmic thyroid nodule  May 2020, the FNA is  negative for cancer from right thyroid nodule   -So the goiter requires supervision as patient is asymptomatic at this time      March 2021  -  ultrasound  : Right lobe hypoechoic partially indistinct nodule 1.8 x  1.8 x 1.8 cm. TI-RADS  Category 4. Right lobe hypoechoic nodule 1.0 x 0.7 x 0.6 cm. TI-RADS Category 4. Isthmus heterogeneous hypoechoic and isoechoic nodule 3.1 x 1.5 x 2.9  cm. TI-RADS Category 3-4.         Oct 62129 : isthmic nodule seems slightly smaller in comparison-  from 2020  to    2021   Will do follow up usg in a year             3. Type 2 DM uncontrolled :  a1c is  6.3 %    from    oct 2022   compared to    5.9 % From  Today by POC oct 2021       Compared to   6.3 %    FROM [facility] 2021   COMPARED TO IN THE PAST, SHE ALWAYS HAD  good range OF A1C  - 6.1% to 6.2%  BUT SHE IS only  ON  Metformin    METFORMIN being adjusted by pcp. Talk to   pcp to start on SGL 2   a day   Patient is advised to check blood sugars 1 times daily by rotation method. 4. HTN : controlled,  continue diltiazem, toprol xl  And  hyzaar . 5. Dyslipidemia : continue crestor       6.  Cad - she f/u with Dr. Glen Astudillo  - On diltiazem and hyzaar and toprol xl       F/u in 1 year       Reviewed results with patient and discussed the labs being ordered today/bnv  Patient voiced understanding of plan of care

## 2022-12-13 ENCOUNTER — OFFICE VISIT (OUTPATIENT)
Dept: OBGYN CLINIC | Age: 67
End: 2022-12-13

## 2022-12-13 VITALS
OXYGEN SATURATION: 96 % | SYSTOLIC BLOOD PRESSURE: 135 MMHG | DIASTOLIC BLOOD PRESSURE: 71 MMHG | RESPIRATION RATE: 16 BRPM | WEIGHT: 204 LBS | BODY MASS INDEX: 30.21 KG/M2 | HEIGHT: 69 IN | HEART RATE: 58 BPM

## 2022-12-13 DIAGNOSIS — Z01.419 GYNECOLOGIC EXAM NORMAL: Primary | ICD-10-CM

## 2022-12-13 DIAGNOSIS — Z12.4 ENCOUNTER FOR SCREENING FOR MALIGNANT NEOPLASM OF CERVIX: ICD-10-CM

## 2022-12-13 NOTE — PROGRESS NOTES
Alix Martini is a 75 y.o. female, , No LMP recorded. Patient is postmenopausal., who presents today for the following:  Chief Complaint   Patient presents with    Annual Exam        Allergies   Allergen Reactions    Codeine Hives       Current Outpatient Medications   Medication Sig    levothyroxine (SYNTHROID) 75 mcg tablet Take one tablet daily, on empty stomach with water only, no other meds or food or drinks for next half hour    turmeric 400 mg cap Take 1 Capsule by mouth daily.  Wixela Inhub 250-50 mcg/dose diskus inhaler TAKE 1 INHALATION TWICE PER DAY. GARGLE MOUTH WITH WATER AFTER EACH USE.  aspirin 81 mg chewable tablet Take 81 mg by mouth daily.  cyanocobalamin 1,000 mcg tablet Take 1,000 mcg by mouth daily.  ascorbic acid, vitamin C, (VITAMIN C) 500 mg tablet Take  by mouth. Three tablets daily    zinc 50 mg tab tablet Take  by mouth daily.  glucose blood VI test strips (OneTouch Ultra Blue Test Strip) strip OneTouch Ultra Blue Test Strip   USE AS DIRECTED (E11.9) ONCE DAILY IN VITRO 90 DAYS    pantoprazole (PROTONIX) 40 mg tablet Take 40 mg by mouth daily.  rosuvastatin (CRESTOR) 10 mg tablet Take 10 mg by mouth nightly.  dilTIAZem ER (CARDIZEM CD) 240 mg capsule Take  by mouth daily.  losartan-hydroCHLOROthiazide (HYZAAR) 100-12.5 mg per tablet Take 1 Tablet by mouth daily. 1/2 tablet daily    levocetirizine (XYZAL) 5 mg tablet Take  by mouth.  metoprolol succinate (TOPROL-XL) 100 mg tablet Take  by mouth daily.  latanoprost (XALATAN) 0.005 % ophthalmic solution Administer 1 Drop to both eyes nightly.  timoloL maleate 0.5 % drpd ophthalmic solution Administer 1 Drop to both eyes daily.  multivitamin, tx-iron-ca-min (THERA-M w/ IRON) 9 mg iron-400 mcg tab tablet Take 1 Tab by mouth daily.  fluticasone propionate (FLONASE) 50 mcg/actuation nasal spray 2 Sprays by Both Nostrils route daily.     metFORMIN (GLUCOPHAGE) 1,000 mg tablet Take 1,000 mg by mouth daily. No current facility-administered medications for this visit. No past medical history on file. No past surgical history on file. No family history on file. Social History     Socioeconomic History    Marital status:      Spouse name: Not on file    Number of children: Not on file    Years of education: Not on file    Highest education level: Not on file   Occupational History    Not on file   Tobacco Use    Smoking status: Never    Smokeless tobacco: Never   Vaping Use    Vaping Use: Never used   Substance and Sexual Activity    Alcohol use: Not Currently    Drug use: Not on file    Sexual activity: Not on file   Other Topics Concern    Not on file   Social History Narrative    Not on file     Social Determinants of Health     Financial Resource Strain: Not on file   Food Insecurity: Not on file   Transportation Needs: Not on file   Physical Activity: Not on file   Stress: Not on file   Social Connections: Not on file   Intimate Partner Violence: Not on file   Housing Stability: Not on file         Annual Exam  Annual    Review of Systems   Constitutional: Negative. Respiratory: Negative. Cardiovascular: Negative. Gastrointestinal: Negative. Genitourinary: Negative. Musculoskeletal: Negative. Skin: Negative. Neurological: Negative. Endo/Heme/Allergies: Negative. Psychiatric/Behavioral: Negative. All other systems reviewed and are negative. /71 (BP 1 Location: Right upper arm, BP Patient Position: Sitting)   Pulse (!) 58   Resp 16   Ht 5' 9\" (1.753 m)   Wt 204 lb (92.5 kg)   SpO2 96%   BMI 30.13 kg/m²    OBGyn Exam   Constitutional:     General Appearance: healthy-appearing, well-nourished, and well-developed; Level of Distress: NAD. Ambulation: ambulating normally. Psychiatric:   Insight: good judgement. Mental Status: normal mood and affect and active and alert. Orientation: to time, place, and person. Memory: recent memory normal and remote memory normal.     Head: Head: normocephalic and atraumatic. Neck:   Neck: supple, FROM, trachea midline, and no masses. Lymph Nodes: no cervical LAD, supraclavicular LAD, axillary LAD, or inguinal LAD. Lungs:   Respiratory effort: no dyspnea. Cardiovascular:   Pulses including femoral / pedal: normal throughout. Breast: Breast: no masses or abnormal secretions and normal appearance. Abdomen:   : no tenderness, guarding, masses, rebound tenderness, or CVA tenderness and non-distended. Female :   External genitalia: no lesions or rash and normal.   Vagina: moist mucosa;   Cervix: no discharge, inflammation, or cervical motion tenderness   Uterus: midline, smooth, and non-tender; normal size   Adnexae: no adnexal mass or tenderness and size WNL. Bladder and Urethra: normal bladder and urethra (except where noted). Back: Thoracolumbar Appearance: normal curvature. 1. Gynecologic exam normal    -PAP IG, RFX APTIMA HPV ASCUS ([id number])    2.  Encounter for screening for malignant neoplasm of cervix          Follow-up and Dispositions    Return for annual.

## 2023-04-23 ENCOUNTER — TRANSCRIBE ORDERS (OUTPATIENT)
Facility: HOSPITAL | Age: 68
End: 2023-04-23

## 2023-04-23 DIAGNOSIS — E04.2 MULTINODULAR GOITER: Primary | ICD-10-CM

## 2023-05-21 RX ORDER — LATANOPROST 50 UG/ML
1 SOLUTION/ DROPS OPHTHALMIC
COMMUNITY

## 2023-05-21 RX ORDER — PANTOPRAZOLE SODIUM 40 MG/1
40 TABLET, DELAYED RELEASE ORAL DAILY
COMMUNITY

## 2023-05-21 RX ORDER — TIMOLOL MALEATE 6.8 MG/ML
1 SOLUTION/ DROPS OPHTHALMIC DAILY
COMMUNITY

## 2023-05-21 RX ORDER — LOSARTAN POTASSIUM AND HYDROCHLOROTHIAZIDE 12.5; 1 MG/1; MG/1
1 TABLET ORAL DAILY
COMMUNITY

## 2023-05-21 RX ORDER — ROSUVASTATIN CALCIUM 10 MG/1
10 TABLET, COATED ORAL NIGHTLY
COMMUNITY

## 2023-05-21 RX ORDER — METOPROLOL SUCCINATE 100 MG/1
TABLET, EXTENDED RELEASE ORAL DAILY
COMMUNITY

## 2023-05-21 RX ORDER — LEVOTHYROXINE SODIUM 0.07 MG/1
TABLET ORAL
COMMUNITY
Start: 2022-10-10

## 2023-05-21 RX ORDER — FLUTICASONE PROPIONATE 50 MCG
2 SPRAY, SUSPENSION (ML) NASAL DAILY
COMMUNITY

## 2023-05-21 RX ORDER — FLUTICASONE PROPIONATE AND SALMETEROL 250; 50 UG/1; UG/1
POWDER RESPIRATORY (INHALATION)
COMMUNITY
Start: 2021-07-13

## 2023-05-21 RX ORDER — DILTIAZEM HYDROCHLORIDE 240 MG/1
CAPSULE, EXTENDED RELEASE ORAL DAILY
COMMUNITY

## 2023-05-21 RX ORDER — ASCORBIC ACID 500 MG
TABLET ORAL
COMMUNITY
End: 2023-06-12

## 2023-05-21 RX ORDER — ASPIRIN 81 MG/1
81 TABLET, CHEWABLE ORAL DAILY
COMMUNITY

## 2023-05-21 RX ORDER — LEVOCETIRIZINE DIHYDROCHLORIDE 5 MG/1
TABLET, FILM COATED ORAL
COMMUNITY

## 2023-09-18 ENCOUNTER — HOSPITAL ENCOUNTER (OUTPATIENT)
Facility: HOSPITAL | Age: 68
Discharge: HOME | End: 2023-09-21
Payer: MEDICARE

## 2023-09-18 DIAGNOSIS — E04.2 MULTINODULAR GOITER: ICD-10-CM

## 2023-09-18 PROCEDURE — 76536 US EXAM OF HEAD AND NECK: CPT

## 2023-10-02 ENCOUNTER — NURSE ONLY (OUTPATIENT)
Age: 68
End: 2023-10-02

## 2023-10-02 DIAGNOSIS — E11.65 TYPE 2 DIABETES MELLITUS WITH HYPERGLYCEMIA, WITHOUT LONG-TERM CURRENT USE OF INSULIN (HCC): Primary | ICD-10-CM

## 2023-10-02 DIAGNOSIS — E04.2 NONTOXIC MULTINODULAR GOITER: ICD-10-CM

## 2023-10-03 LAB
ALBUMIN SERPL-MCNC: 3.8 G/DL (ref 3.5–5)
ALBUMIN/GLOB SERPL: 1 (ref 1.1–2.2)
ALP SERPL-CCNC: 123 U/L (ref 45–117)
ALT SERPL-CCNC: 26 U/L (ref 12–78)
ANION GAP SERPL CALC-SCNC: 5 MMOL/L (ref 5–15)
AST SERPL-CCNC: 11 U/L (ref 15–37)
BILIRUB SERPL-MCNC: 0.5 MG/DL (ref 0.2–1)
BUN SERPL-MCNC: 11 MG/DL (ref 6–20)
BUN/CREAT SERPL: 20 (ref 12–20)
CALCIUM SERPL-MCNC: 9.4 MG/DL (ref 8.5–10.1)
CHLORIDE SERPL-SCNC: 107 MMOL/L (ref 97–108)
CHOLEST SERPL-MCNC: 126 MG/DL
CO2 SERPL-SCNC: 28 MMOL/L (ref 21–32)
CREAT SERPL-MCNC: 0.55 MG/DL (ref 0.55–1.02)
CREAT UR-MCNC: 144 MG/DL
EST. AVERAGE GLUCOSE BLD GHB EST-MCNC: 134 MG/DL
GLOBULIN SER CALC-MCNC: 3.7 G/DL (ref 2–4)
GLUCOSE SERPL-MCNC: 136 MG/DL (ref 65–100)
HBA1C MFR BLD: 6.3 % (ref 4–5.6)
HDLC SERPL-MCNC: 52 MG/DL
HDLC SERPL: 2.4 (ref 0–5)
LDLC SERPL CALC-MCNC: 60.6 MG/DL (ref 0–100)
MICROALBUMIN UR-MCNC: 3.3 MG/DL
MICROALBUMIN/CREAT UR-RTO: 23 MG/G (ref 0–30)
POTASSIUM SERPL-SCNC: 4.2 MMOL/L (ref 3.5–5.1)
PROT SERPL-MCNC: 7.5 G/DL (ref 6.4–8.2)
SODIUM SERPL-SCNC: 140 MMOL/L (ref 136–145)
TRIGL SERPL-MCNC: 67 MG/DL
TSH SERPL DL<=0.05 MIU/L-ACNC: 0.42 UIU/ML (ref 0.36–3.74)
VLDLC SERPL CALC-MCNC: 13.4 MG/DL

## 2023-10-09 ENCOUNTER — OFFICE VISIT (OUTPATIENT)
Age: 68
End: 2023-10-09
Payer: MEDICARE

## 2023-10-09 VITALS
OXYGEN SATURATION: 94 % | RESPIRATION RATE: 18 BRPM | WEIGHT: 210 LBS | HEART RATE: 54 BPM | TEMPERATURE: 98.2 F | DIASTOLIC BLOOD PRESSURE: 77 MMHG | SYSTOLIC BLOOD PRESSURE: 138 MMHG | BODY MASS INDEX: 31.1 KG/M2 | HEIGHT: 69 IN

## 2023-10-09 DIAGNOSIS — I10 ESSENTIAL (PRIMARY) HYPERTENSION: ICD-10-CM

## 2023-10-09 DIAGNOSIS — E78.2 MIXED HYPERLIPIDEMIA: ICD-10-CM

## 2023-10-09 DIAGNOSIS — E11.65 TYPE 2 DIABETES MELLITUS WITH HYPERGLYCEMIA, WITHOUT LONG-TERM CURRENT USE OF INSULIN (HCC): Primary | ICD-10-CM

## 2023-10-09 DIAGNOSIS — E04.2 MULTINODULAR GOITER: ICD-10-CM

## 2023-10-09 PROCEDURE — 3078F DIAST BP <80 MM HG: CPT | Performed by: INTERNAL MEDICINE

## 2023-10-09 PROCEDURE — G8417 CALC BMI ABV UP PARAM F/U: HCPCS | Performed by: INTERNAL MEDICINE

## 2023-10-09 PROCEDURE — 3017F COLORECTAL CA SCREEN DOC REV: CPT | Performed by: INTERNAL MEDICINE

## 2023-10-09 PROCEDURE — G8400 PT W/DXA NO RESULTS DOC: HCPCS | Performed by: INTERNAL MEDICINE

## 2023-10-09 PROCEDURE — 1090F PRES/ABSN URINE INCON ASSESS: CPT | Performed by: INTERNAL MEDICINE

## 2023-10-09 PROCEDURE — 1123F ACP DISCUSS/DSCN MKR DOCD: CPT | Performed by: INTERNAL MEDICINE

## 2023-10-09 PROCEDURE — 99214 OFFICE O/P EST MOD 30 MIN: CPT | Performed by: INTERNAL MEDICINE

## 2023-10-09 PROCEDURE — 3074F SYST BP LT 130 MM HG: CPT | Performed by: INTERNAL MEDICINE

## 2023-10-09 PROCEDURE — 3044F HG A1C LEVEL LT 7.0%: CPT | Performed by: INTERNAL MEDICINE

## 2023-10-09 PROCEDURE — G8484 FLU IMMUNIZE NO ADMIN: HCPCS | Performed by: INTERNAL MEDICINE

## 2023-10-09 PROCEDURE — 1036F TOBACCO NON-USER: CPT | Performed by: INTERNAL MEDICINE

## 2023-10-09 PROCEDURE — G8428 CUR MEDS NOT DOCUMENT: HCPCS | Performed by: INTERNAL MEDICINE

## 2023-10-09 PROCEDURE — 2022F DILAT RTA XM EVC RTNOPTHY: CPT | Performed by: INTERNAL MEDICINE

## 2023-10-09 RX ORDER — TURMERIC ROOT EXTRACT 500 MG
1 TABLET ORAL AS NEEDED
COMMUNITY

## 2023-10-09 RX ORDER — ROSUVASTATIN CALCIUM 10 MG/1
10 TABLET, COATED ORAL NIGHTLY
Qty: 90 TABLET | Refills: 3 | Status: SHIPPED | OUTPATIENT
Start: 2023-10-09

## 2023-10-09 RX ORDER — LEVOTHYROXINE SODIUM 0.07 MG/1
TABLET ORAL
Qty: 90 TABLET | Refills: 3 | Status: SHIPPED | OUTPATIENT
Start: 2023-10-09

## 2023-10-09 RX ORDER — BLOOD SUGAR DIAGNOSTIC
STRIP MISCELLANEOUS
Qty: 100 EACH | Refills: 3 | Status: SHIPPED | OUTPATIENT
Start: 2023-10-09

## 2023-10-09 RX ORDER — BLOOD SUGAR DIAGNOSTIC
STRIP MISCELLANEOUS
COMMUNITY
End: 2023-10-09 | Stop reason: SDUPTHER

## 2023-10-09 NOTE — PROGRESS NOTES
Terence Magdaleno is a 70 y.o. female here for   Chief Complaint   Patient presents with    Diabetes    Thyroid Problem       1. Have you been to the ER, urgent care clinic since your last visit? Hospitalized since your last visit? -no    2. Have you seen or consulted any other health care providers outside of the [address] [place] [address] Avenue since your last visit? Include any pap smears or colon screening. -PCP

## 2023-10-09 NOTE — PATIENT INSTRUCTIONS
SPECIFIC INSTRUCTIONS BELOW     Stay on jardiance 10 mg  a   day  before b-fast     Stay on metformin 500 mg once a day       -------------PAY ATTENTION TO THESE GENERAL INSTRUCTIONS -----------------      - The medications prescribed at this visit will not be available at pharmacy until 6 pm       - YOUR MED LIST IS NOT UP TO DATE AS SOME CHANGES ARE BEING MADE AFTER THE VISIT - FOLLOW SPECIFIC INSTRUCTIONS  ABOVE     -ANY tests other than blood work, which you opt to do  outside the  [facility] imaging facilities, you are responsible for prior authorizations if  required    - [address] [place] [address] Drive AVS- PLEASE IGNORE     Results     *Normal results will not be notified by a phone call starting January 1 2021   *If you have an upcoming visit, the results will be discussed at the visit   *Please sign up for MY CHART if you want access to your lab and test results  *Abnormal results which require immediate attention will be notified by phone call   *Abnormal results which do not require immediate assistance will be notified in 1-2 weeks       Refills    -    have your pharmacy send us a refill request . Refills are done max for one year and a visit is a must before refills are extended    Follow up appointments -  highly encourage you to make it when you are checking out. We can accommodate you into the schedule based on your clinical situation, but not for extending refills beyond a year. Labs are important to give refills and is important to get labs before the visit     Phone calls  -  Allow  24 hrs.  for non-urgent calls to be returned  Prior authorization - It may take 2-4 weeks to process  Forms  -  FMLA, DMV etc., will take up to 2 weeks to process  Cancellations - please notify the office 2 days in advance   Samples  - will only be dispensed at visits       If not showing for the appointments and cancelling appointments within 24 hours are kept track of and three  of such

## 2023-10-09 NOTE — PROGRESS NOTES
[facility]              Yaa Felder MD ESSENCE Jaramillo is a 70 y.o.  female presents today as a follow up           Patient here for   one year follow up AFTER  Last  visit  for Type 2 diabetes mellitus   And     Hypothyroidism   And MNG  From October 2022     Reports no compressive symptoms        October 2022     Weight is stable    She is sick with sinus infection            Oct 2021       Compliant with meds    Has no symptoms          Nov 2020    Referred : by Dr Evelia Vargas    H/o diabetes for 10  years    Current A1C is <7 %  and symptoms/problems include none    Current diabetic medications include metformin    Patient has always been that conscious, and exercising by which she is kept her diabetes in very good control. She got the log and the sugars are good   She has had multinodular goiter for several years and she had seen that it was growing and Dr. Siddhartha Vargas had her go for biopsy which turned out to be negative for cancer   Patient denies any discomfort or dysphagia from goiter             Review of Systems    None          Physical Exam    Constitutional: She is oriented to person, place, and time. She appears well-developed and well-nourished. Neck:  thyromegaly present. Psychiatric: She has a normal mood and affect.            Labs    Diabetes    Lab Results   Component Value Date    LABA1C 6.3 (H) 10/02/2023    LABA1C 6.3 (H) 10/03/2022    LABA1C 6.3 (H) 03/01/2021       Lab Results   Component Value Date     10/02/2023    K 4.2 10/02/2023     10/02/2023    CO2 28 10/02/2023    BUN 11 10/02/2023    CREATININE 0.55 10/02/2023    GLUCOSE 136 (H) 10/02/2023    CALCIUM 9.4 10/02/2023    PROT 7.5 10/02/2023    LABALBU 3.8 10/02/2023    BILITOT 0.5 10/02/2023    ALKPHOS 123 (H) 10/02/2023    AST 11 (L) 10/02/2023    ALT 26 10/02/2023    LABGLOM >60 10/02/2023    GFRAA 106 03/01/2021    AGRATIO 1.8 10/03/2022    GLOB 3.7 10/02/2023    CHOL

## 2024-09-04 DIAGNOSIS — E11.65 TYPE 2 DIABETES MELLITUS WITH HYPERGLYCEMIA, WITHOUT LONG-TERM CURRENT USE OF INSULIN (HCC): Primary | ICD-10-CM

## 2024-09-04 RX ORDER — LEVOTHYROXINE SODIUM 75 UG/1
TABLET ORAL
Qty: 90 TABLET | Refills: 0 | Status: SHIPPED | OUTPATIENT
Start: 2024-09-04

## 2024-09-09 DIAGNOSIS — E78.2 MIXED HYPERLIPIDEMIA: ICD-10-CM

## 2024-09-09 DIAGNOSIS — I10 ESSENTIAL (PRIMARY) HYPERTENSION: ICD-10-CM

## 2024-09-09 DIAGNOSIS — E11.65 TYPE 2 DIABETES MELLITUS WITH HYPERGLYCEMIA, WITHOUT LONG-TERM CURRENT USE OF INSULIN (HCC): ICD-10-CM

## 2024-09-09 DIAGNOSIS — E04.2 MULTINODULAR GOITER: ICD-10-CM

## 2024-09-18 ENCOUNTER — APPOINTMENT (OUTPATIENT)
Facility: HOSPITAL | Age: 69
End: 2024-09-18
Attending: EMERGENCY MEDICINE
Payer: MEDICARE

## 2024-09-18 ENCOUNTER — HOSPITAL ENCOUNTER (EMERGENCY)
Facility: HOSPITAL | Age: 69
Discharge: HOME | End: 2024-09-18
Attending: EMERGENCY MEDICINE
Payer: MEDICARE

## 2024-09-18 ENCOUNTER — APPOINTMENT (OUTPATIENT)
Facility: HOSPITAL | Age: 69
End: 2024-09-18
Payer: MEDICARE

## 2024-09-18 VITALS
HEART RATE: 62 BPM | BODY MASS INDEX: 30.51 KG/M2 | RESPIRATION RATE: 16 BRPM | SYSTOLIC BLOOD PRESSURE: 127 MMHG | TEMPERATURE: 98.5 F | OXYGEN SATURATION: 99 % | WEIGHT: 206 LBS | DIASTOLIC BLOOD PRESSURE: 82 MMHG | HEIGHT: 69 IN

## 2024-09-18 DIAGNOSIS — M54.32 SCIATICA OF LEFT SIDE: ICD-10-CM

## 2024-09-18 DIAGNOSIS — J45.41 MODERATE PERSISTENT ASTHMATIC BRONCHITIS WITH ACUTE EXACERBATION: Primary | ICD-10-CM

## 2024-09-18 DIAGNOSIS — M48.061 SPINAL STENOSIS OF LUMBAR REGION, UNSPECIFIED WHETHER NEUROGENIC CLAUDICATION PRESENT: ICD-10-CM

## 2024-09-18 LAB
ALBUMIN SERPL-MCNC: 3.7 G/DL (ref 3.5–5)
ALBUMIN/GLOB SERPL: 0.9 (ref 1.1–2.2)
ALP SERPL-CCNC: 102 U/L (ref 45–117)
ALT SERPL-CCNC: 19 U/L (ref 12–78)
ANION GAP SERPL CALC-SCNC: 7 MMOL/L (ref 2–12)
APPEARANCE UR: CLEAR
AST SERPL W P-5'-P-CCNC: 15 U/L (ref 15–37)
BACTERIA URNS QL MICRO: NEGATIVE /HPF
BASOPHILS # BLD: 0 K/UL (ref 0–0.1)
BASOPHILS NFR BLD: 0 % (ref 0–1)
BILIRUB SERPL-MCNC: 0.6 MG/DL (ref 0.2–1)
BILIRUB UR QL: NEGATIVE
BNP SERPL-MCNC: 201 PG/ML
BUN SERPL-MCNC: 10 MG/DL (ref 6–20)
BUN/CREAT SERPL: 14 (ref 12–20)
CA-I BLD-MCNC: 9.3 MG/DL (ref 8.5–10.1)
CHLORIDE SERPL-SCNC: 107 MMOL/L (ref 97–108)
CO2 SERPL-SCNC: 25 MMOL/L (ref 21–32)
COLOR UR: ABNORMAL
CREAT SERPL-MCNC: 0.72 MG/DL (ref 0.55–1.02)
D DIMER PPP FEU-MCNC: 0.27 UG/ML(FEU)
DIFFERENTIAL METHOD BLD: NORMAL
EKG ATRIAL RATE: 136 BPM
EKG DIAGNOSIS: NORMAL
EKG P AXIS: 26 DEGREES
EKG P-R INTERVAL: 252 MS
EKG Q-T INTERVAL: 168 MS
EKG QRS DURATION: 94 MS
EKG QTC CALCULATION (BAZETT): 252 MS
EKG R AXIS: 11 DEGREES
EKG T AXIS: 210 DEGREES
EKG VENTRICULAR RATE: 136 BPM
EOSINOPHIL # BLD: 0.1 K/UL (ref 0–0.4)
EOSINOPHIL NFR BLD: 1 % (ref 0–7)
EPITH CASTS URNS QL MICRO: ABNORMAL /LPF
ERYTHROCYTE [DISTWIDTH] IN BLOOD BY AUTOMATED COUNT: 13.4 % (ref 11.5–14.5)
GLOBULIN SER CALC-MCNC: 4 G/DL (ref 2–4)
GLUCOSE SERPL-MCNC: 172 MG/DL (ref 65–100)
GLUCOSE UR STRIP.AUTO-MCNC: >500 MG/DL
HCT VFR BLD AUTO: 44.5 % (ref 35–47)
HGB BLD-MCNC: 15.1 G/DL (ref 11.5–16)
HGB UR QL STRIP: NEGATIVE
IMM GRANULOCYTES # BLD AUTO: 0 K/UL (ref 0–0.04)
IMM GRANULOCYTES NFR BLD AUTO: 0 % (ref 0–0.5)
INR PPP: 1.3 (ref 0.9–1.1)
KETONES UR QL STRIP.AUTO: NEGATIVE MG/DL
LEUKOCYTE ESTERASE UR QL STRIP.AUTO: ABNORMAL
LYMPHOCYTES # BLD: 1.9 K/UL (ref 0.8–3.5)
LYMPHOCYTES NFR BLD: 22 % (ref 12–49)
MAGNESIUM SERPL-MCNC: 2 MG/DL (ref 1.6–2.4)
MCH RBC QN AUTO: 30.3 PG (ref 26–34)
MCHC RBC AUTO-ENTMCNC: 33.9 G/DL (ref 30–36.5)
MCV RBC AUTO: 89.2 FL (ref 80–99)
MONOCYTES # BLD: 0.7 K/UL (ref 0–1)
MONOCYTES NFR BLD: 8 % (ref 5–13)
NEUTS SEG # BLD: 6 K/UL (ref 1.8–8)
NEUTS SEG NFR BLD: 69 % (ref 32–75)
NITRITE UR QL STRIP.AUTO: NEGATIVE
NRBC # BLD: 0 K/UL (ref 0–0.01)
NRBC BLD-RTO: 0 PER 100 WBC
PH UR STRIP: 7 (ref 5–8)
PLATELET # BLD AUTO: 263 K/UL (ref 150–400)
PMV BLD AUTO: 9.3 FL (ref 8.9–12.9)
POTASSIUM SERPL-SCNC: 3.8 MMOL/L (ref 3.5–5.1)
PROT SERPL-MCNC: 7.7 G/DL (ref 6.4–8.2)
PROT UR STRIP-MCNC: NEGATIVE MG/DL
PROTHROMBIN TIME: 16.8 SEC (ref 11.9–14.6)
RBC # BLD AUTO: 4.99 M/UL (ref 3.8–5.2)
RBC #/AREA URNS HPF: ABNORMAL /HPF (ref 0–5)
SODIUM SERPL-SCNC: 139 MMOL/L (ref 136–145)
SP GR UR REFRACTOMETRY: 1.01 (ref 1–1.03)
TROPONIN I SERPL HS-MCNC: 6 NG/L (ref 0–51)
URINE CULTURE IF INDICATED: ABNORMAL
UROBILINOGEN UR QL STRIP.AUTO: 0.1 EU/DL (ref 0.1–1)
WBC # BLD AUTO: 8.7 K/UL (ref 3.6–11)
WBC URNS QL MICRO: ABNORMAL /HPF (ref 0–4)

## 2024-09-18 PROCEDURE — 72131 CT LUMBAR SPINE W/O DYE: CPT

## 2024-09-18 PROCEDURE — 36415 COLL VENOUS BLD VENIPUNCTURE: CPT

## 2024-09-18 PROCEDURE — 85379 FIBRIN DEGRADATION QUANT: CPT

## 2024-09-18 PROCEDURE — 80053 COMPREHEN METABOLIC PANEL: CPT

## 2024-09-18 PROCEDURE — 71045 X-RAY EXAM CHEST 1 VIEW: CPT

## 2024-09-18 PROCEDURE — 93005 ELECTROCARDIOGRAM TRACING: CPT | Performed by: EMERGENCY MEDICINE

## 2024-09-18 PROCEDURE — 99285 EMERGENCY DEPT VISIT HI MDM: CPT

## 2024-09-18 PROCEDURE — 85025 COMPLETE CBC W/AUTO DIFF WBC: CPT

## 2024-09-18 PROCEDURE — 6360000004 HC RX CONTRAST MEDICATION: Performed by: EMERGENCY MEDICINE

## 2024-09-18 PROCEDURE — 71275 CT ANGIOGRAPHY CHEST: CPT

## 2024-09-18 PROCEDURE — 83880 ASSAY OF NATRIURETIC PEPTIDE: CPT

## 2024-09-18 PROCEDURE — 83735 ASSAY OF MAGNESIUM: CPT

## 2024-09-18 PROCEDURE — 84484 ASSAY OF TROPONIN QUANT: CPT

## 2024-09-18 PROCEDURE — 85610 PROTHROMBIN TIME: CPT

## 2024-09-18 PROCEDURE — 81001 URINALYSIS AUTO W/SCOPE: CPT

## 2024-09-18 RX ORDER — PREDNISONE 20 MG/1
60 TABLET ORAL DAILY
Qty: 15 TABLET | Refills: 0 | Status: SHIPPED | OUTPATIENT
Start: 2024-09-18 | End: 2024-09-23

## 2024-09-18 RX ORDER — ALBUTEROL SULFATE 90 UG/1
2 INHALANT RESPIRATORY (INHALATION) 4 TIMES DAILY PRN
Qty: 18 G | Refills: 0 | Status: SHIPPED | OUTPATIENT
Start: 2024-09-18

## 2024-09-18 RX ORDER — AZITHROMYCIN 250 MG/1
250 TABLET, FILM COATED ORAL SEE ADMIN INSTRUCTIONS
Qty: 6 TABLET | Refills: 0 | Status: SHIPPED | OUTPATIENT
Start: 2024-09-18 | End: 2024-09-23

## 2024-09-18 RX ORDER — IOPAMIDOL 755 MG/ML
100 INJECTION, SOLUTION INTRAVASCULAR
Status: COMPLETED | OUTPATIENT
Start: 2024-09-18 | End: 2024-09-18

## 2024-09-18 RX ADMIN — IOPAMIDOL 100 ML: 755 INJECTION, SOLUTION INTRAVENOUS at 08:59

## 2024-09-18 ASSESSMENT — LIFESTYLE VARIABLES
HOW MANY STANDARD DRINKS CONTAINING ALCOHOL DO YOU HAVE ON A TYPICAL DAY: PATIENT DOES NOT DRINK
HOW OFTEN DO YOU HAVE A DRINK CONTAINING ALCOHOL: NEVER

## 2024-09-18 ASSESSMENT — PAIN SCALES - GENERAL
PAINLEVEL_OUTOF10: 0
PAINLEVEL_OUTOF10: 7

## 2024-09-18 ASSESSMENT — PAIN - FUNCTIONAL ASSESSMENT
PAIN_FUNCTIONAL_ASSESSMENT: NONE - DENIES PAIN
PAIN_FUNCTIONAL_ASSESSMENT: 0-10

## 2024-09-18 ASSESSMENT — PAIN DESCRIPTION - LOCATION: LOCATION: CHEST;BACK

## 2024-10-01 ENCOUNTER — LAB (OUTPATIENT)
Age: 69
End: 2024-10-01

## 2024-10-01 DIAGNOSIS — E04.2 MULTINODULAR GOITER: ICD-10-CM

## 2024-10-01 DIAGNOSIS — E11.65 TYPE 2 DIABETES MELLITUS WITH HYPERGLYCEMIA, WITHOUT LONG-TERM CURRENT USE OF INSULIN (HCC): Primary | ICD-10-CM

## 2024-10-01 DIAGNOSIS — E78.2 MIXED HYPERLIPIDEMIA: ICD-10-CM

## 2024-10-01 DIAGNOSIS — E11.65 TYPE 2 DIABETES MELLITUS WITH HYPERGLYCEMIA, WITHOUT LONG-TERM CURRENT USE OF INSULIN (HCC): ICD-10-CM

## 2024-10-01 DIAGNOSIS — E06.3 AUTOIMMUNE THYROIDITIS: ICD-10-CM

## 2024-10-01 LAB
ALBUMIN SERPL-MCNC: 3.5 G/DL (ref 3.5–5)
ALBUMIN/GLOB SERPL: 1 (ref 1.1–2.2)
ALP SERPL-CCNC: 109 U/L (ref 45–117)
ALT SERPL-CCNC: 20 U/L (ref 12–78)
ANION GAP SERPL CALC-SCNC: 4 MMOL/L (ref 2–12)
AST SERPL-CCNC: 9 U/L (ref 15–37)
BILIRUB SERPL-MCNC: 0.5 MG/DL (ref 0.2–1)
BUN SERPL-MCNC: 13 MG/DL (ref 6–20)
BUN/CREAT SERPL: 23 (ref 12–20)
CALCIUM SERPL-MCNC: 9.3 MG/DL (ref 8.5–10.1)
CHLORIDE SERPL-SCNC: 106 MMOL/L (ref 97–108)
CHOLEST SERPL-MCNC: 109 MG/DL
CO2 SERPL-SCNC: 29 MMOL/L (ref 21–32)
CREAT SERPL-MCNC: 0.57 MG/DL (ref 0.55–1.02)
CREAT UR-MCNC: 146 MG/DL
EST. AVERAGE GLUCOSE BLD GHB EST-MCNC: 137 MG/DL
GLOBULIN SER CALC-MCNC: 3.5 G/DL (ref 2–4)
GLUCOSE SERPL-MCNC: 119 MG/DL (ref 65–100)
HBA1C MFR BLD: 6.4 % (ref 4–5.6)
HDLC SERPL-MCNC: 45 MG/DL
HDLC SERPL: 2.4 (ref 0–5)
LDLC SERPL CALC-MCNC: 47.2 MG/DL (ref 0–100)
MICROALBUMIN UR-MCNC: 1.6 MG/DL
MICROALBUMIN/CREAT UR-RTO: 11 MG/G (ref 0–30)
POTASSIUM SERPL-SCNC: 4.2 MMOL/L (ref 3.5–5.1)
PROT SERPL-MCNC: 7 G/DL (ref 6.4–8.2)
SODIUM SERPL-SCNC: 139 MMOL/L (ref 136–145)
T4 FREE SERPL-MCNC: 1.4 NG/DL (ref 0.8–1.5)
TRIGL SERPL-MCNC: 84 MG/DL
TSH SERPL DL<=0.05 MIU/L-ACNC: 0.07 UIU/ML (ref 0.36–3.74)
VLDLC SERPL CALC-MCNC: 16.8 MG/DL

## 2024-10-07 ENCOUNTER — TRANSCRIBE ORDERS (OUTPATIENT)
Facility: HOSPITAL | Age: 69
End: 2024-10-07

## 2024-10-07 DIAGNOSIS — Z12.31 VISIT FOR SCREENING MAMMOGRAM: Primary | ICD-10-CM

## 2024-10-08 ENCOUNTER — OFFICE VISIT (OUTPATIENT)
Age: 69
End: 2024-10-08
Payer: MEDICARE

## 2024-10-08 VITALS
WEIGHT: 205.2 LBS | TEMPERATURE: 97.2 F | BODY MASS INDEX: 30.39 KG/M2 | OXYGEN SATURATION: 95 % | DIASTOLIC BLOOD PRESSURE: 67 MMHG | HEART RATE: 50 BPM | SYSTOLIC BLOOD PRESSURE: 111 MMHG | HEIGHT: 69 IN

## 2024-10-08 DIAGNOSIS — E04.2 MULTINODULAR GOITER: ICD-10-CM

## 2024-10-08 DIAGNOSIS — E11.65 TYPE 2 DIABETES MELLITUS WITH HYPERGLYCEMIA, WITHOUT LONG-TERM CURRENT USE OF INSULIN (HCC): ICD-10-CM

## 2024-10-08 DIAGNOSIS — E05.90 HYPERTHYROIDISM: ICD-10-CM

## 2024-10-08 DIAGNOSIS — E78.2 MIXED HYPERLIPIDEMIA: ICD-10-CM

## 2024-10-08 DIAGNOSIS — E03.8 OTHER SPECIFIED HYPOTHYROIDISM: Primary | ICD-10-CM

## 2024-10-08 DIAGNOSIS — I48.11 LONGSTANDING PERSISTENT ATRIAL FIBRILLATION (HCC): ICD-10-CM

## 2024-10-08 PROCEDURE — 1036F TOBACCO NON-USER: CPT | Performed by: INTERNAL MEDICINE

## 2024-10-08 PROCEDURE — G8484 FLU IMMUNIZE NO ADMIN: HCPCS | Performed by: INTERNAL MEDICINE

## 2024-10-08 PROCEDURE — 99215 OFFICE O/P EST HI 40 MIN: CPT | Performed by: INTERNAL MEDICINE

## 2024-10-08 PROCEDURE — 1123F ACP DISCUSS/DSCN MKR DOCD: CPT | Performed by: INTERNAL MEDICINE

## 2024-10-08 PROCEDURE — 2022F DILAT RTA XM EVC RTNOPTHY: CPT | Performed by: INTERNAL MEDICINE

## 2024-10-08 PROCEDURE — G8427 DOCREV CUR MEDS BY ELIG CLIN: HCPCS | Performed by: INTERNAL MEDICINE

## 2024-10-08 PROCEDURE — 3017F COLORECTAL CA SCREEN DOC REV: CPT | Performed by: INTERNAL MEDICINE

## 2024-10-08 PROCEDURE — G8417 CALC BMI ABV UP PARAM F/U: HCPCS | Performed by: INTERNAL MEDICINE

## 2024-10-08 PROCEDURE — G8400 PT W/DXA NO RESULTS DOC: HCPCS | Performed by: INTERNAL MEDICINE

## 2024-10-08 PROCEDURE — 1090F PRES/ABSN URINE INCON ASSESS: CPT | Performed by: INTERNAL MEDICINE

## 2024-10-08 PROCEDURE — 3044F HG A1C LEVEL LT 7.0%: CPT | Performed by: INTERNAL MEDICINE

## 2024-10-08 RX ORDER — LEVOTHYROXINE SODIUM 75 UG/1
TABLET ORAL
Qty: 80 TABLET | Refills: 1 | Status: SHIPPED | OUTPATIENT
Start: 2024-10-08

## 2024-10-08 RX ORDER — LANCETS 30 GAUGE
EACH MISCELLANEOUS
COMMUNITY
Start: 2024-08-26

## 2024-10-08 RX ORDER — METFORMIN HYDROCHLORIDE 750 MG/1
TABLET, EXTENDED RELEASE ORAL
Qty: 180 TABLET | Refills: 3 | Status: SHIPPED | OUTPATIENT
Start: 2024-10-08

## 2024-10-08 NOTE — PROGRESS NOTES
[facility]              Chelsey Gilmore MD FACE            Sindi DE LEÓN Campos is a 69 y.o.  female presents today as a follow up           Patient here for   one year follow up AFTER  Last  visit  for Type 2 diabetes mellitus   And     Hypothyroidism   And MNG    From October 2023       Patient is a very pleasant   She could not  pay  for   jardiance  because of  cost  recently oct 2024     She  is  on  eliquis  for atrial fib   She is asymptomatic  of compressive symptoms         October 2023      Reports no compressive symptoms          Nov 2020    Referred : by Dr Haider    H/o diabetes for 10  years    Current A1C is <7 %  and symptoms/problems include none    Current diabetic medications include metformin    Patient has always been that conscious, and exercising by which she is kept her diabetes in very good control.   She got the log and the sugars are good   She has had multinodular goiter for several years and she had seen that it was growing and Dr. Hawkins had her go for biopsy which turned out to be negative for cancer   Patient denies any discomfort or dysphagia from goiter             Review of Systems    None          Physical Exam    Constitutional: She is oriented to person, place, and time. She appears well-developed and well-nourished.    Neck:  thyromegaly present.    Psychiatric: She has a normal mood and affect.           Labs    Diabetes    Lab Results   Component Value Date    LABA1C 6.4 (H) 10/01/2024    LABA1C 6.3 (H) 10/02/2023    LABA1C 6.3 (H) 10/03/2022       Lab Results   Component Value Date     10/01/2024    K 4.2 10/01/2024     10/01/2024    CO2 29 10/01/2024    BUN 13 10/01/2024    CREATININE 0.57 10/01/2024    GLUCOSE 119 (H) 10/01/2024    CALCIUM 9.3 10/01/2024    BILITOT 0.5 10/01/2024    ALKPHOS 109 10/01/2024    AST 9 (L) 10/01/2024    ALT 20 10/01/2024    LABGLOM >90 10/01/2024    GFRAA 106 03/01/2021    AGRATIO 1.8 10/03/2022

## 2024-10-08 NOTE — PROGRESS NOTES
Sindi Campos is a 69 y.o. female here for   Chief Complaint   Patient presents with    Diabetes    Follow-up       1. Have you been to the ER, urgent care clinic since your last visit?  Hospitalized since your last visit? -Yes,09/18/2024 Asthma, back pain    2. Have you seen or consulted any other health care providers outside of the [facility] since your last visit?  Include any pap smears or colon screening.-No    /67 (Site: Left Upper Arm, Position: Sitting, Cuff Size: Large Adult)   Pulse 50   Temp 97.2 °F (36.2 °C) (Temporal)   Ht 1.753 m (5' 9\")   Wt 93.1 kg (205 lb 3.2 oz)   SpO2 95%   BMI 30.30 kg/m²

## 2024-10-08 NOTE — PATIENT INSTRUCTIONS
SPECIFIC INSTRUCTIONS BELOW     Stop  jardiance      Stop  metformin   Change to   metformin  750   mg once a day  with dinner       ---------------------------------------------------------    Levothyroxine    75 mcg  A day  daily except  on  sat   and  sun , on empty stomach with water only, no other meds or food or drinks   For next half hour       Take any kind of vitamins, calcium, iron   Pills  4 hours later        -------------PAY ATTENTION TO THESE GENERAL INSTRUCTIONS -----------------      - The medications prescribed at this visit will not be available at pharmacy until 6 pm       - YOUR MED LIST IS NOT UP TO DATE AS SOME CHANGES ARE BEING MADE AFTER THE VISIT - FOLLOW SPECIFIC INSTRUCTIONS  ABOVE     -ANY tests other than blood work, which you opt to do  outside the  [facility] imaging facilities, you are responsible for prior authorizations if  required    - HEALTH MAINTENANCE IS NOT GOING TO BE UP TO DATE ON YOUR AVS- PLEASE IGNORE     Results     *Normal results will not be notified by a phone call starting January 1 2021   *If you have an upcoming visit, the results will be discussed at the visit   *Please sign up for MY CHART if you want access to your lab and test results  *Abnormal results which require immediate attention will be notified by phone call   *Abnormal results which do not require immediate assistance will be notified in 1-2 weeks       Refills    -    have your pharmacy send us a refill request . Refills are done max for one year and a visit is a must before refills are extended    Follow up appointments -  highly encourage you to make it when you are checking out. We can accommodate you into the schedule based on your clinical situation, but not for extending refills beyond a year. Labs are important to give refills and is important to get labs before the visit     Phone calls  -  Allow  24 hrs. for non-urgent calls to be returned  Prior authorization - It may take 2-4 weeks to

## 2024-10-22 DIAGNOSIS — E78.2 MIXED HYPERLIPIDEMIA: Primary | ICD-10-CM

## 2024-10-24 RX ORDER — ROSUVASTATIN CALCIUM 10 MG/1
10 TABLET, COATED ORAL NIGHTLY
Qty: 90 TABLET | Refills: 3 | Status: SHIPPED | OUTPATIENT
Start: 2024-10-24

## 2024-12-02 ENCOUNTER — HOSPITAL ENCOUNTER (OUTPATIENT)
Facility: HOSPITAL | Age: 69
Discharge: HOME | End: 2024-12-05
Payer: MEDICARE

## 2024-12-02 DIAGNOSIS — M25.571 RIGHT ANKLE PAIN, UNSPECIFIED CHRONICITY: ICD-10-CM

## 2024-12-02 DIAGNOSIS — M25.571 RIGHT ANKLE PAIN, UNSPECIFIED CHRONICITY: Primary | ICD-10-CM

## 2024-12-02 PROCEDURE — 73610 X-RAY EXAM OF ANKLE: CPT

## 2024-12-03 ENCOUNTER — LAB (OUTPATIENT)
Age: 69
End: 2024-12-03

## 2024-12-03 ENCOUNTER — OFFICE VISIT (OUTPATIENT)
Age: 69
End: 2024-12-03
Payer: MEDICARE

## 2024-12-03 VITALS
HEART RATE: 59 BPM | HEIGHT: 69 IN | DIASTOLIC BLOOD PRESSURE: 80 MMHG | WEIGHT: 205 LBS | BODY MASS INDEX: 30.36 KG/M2 | OXYGEN SATURATION: 97 % | RESPIRATION RATE: 16 BRPM | TEMPERATURE: 97.1 F | SYSTOLIC BLOOD PRESSURE: 133 MMHG

## 2024-12-03 DIAGNOSIS — M25.471 PAIN AND SWELLING OF RIGHT ANKLE: ICD-10-CM

## 2024-12-03 DIAGNOSIS — E03.8 OTHER SPECIFIED HYPOTHYROIDISM: ICD-10-CM

## 2024-12-03 DIAGNOSIS — M25.571 ACUTE RIGHT ANKLE PAIN: ICD-10-CM

## 2024-12-03 DIAGNOSIS — S82.831S CLOSED FRACTURE OF DISTAL END OF RIGHT FIBULA, UNSPECIFIED FRACTURE MORPHOLOGY, SEQUELA: Primary | ICD-10-CM

## 2024-12-03 DIAGNOSIS — M25.571 PAIN AND SWELLING OF RIGHT ANKLE: ICD-10-CM

## 2024-12-03 DIAGNOSIS — E11.65 TYPE 2 DIABETES MELLITUS WITH HYPERGLYCEMIA, WITHOUT LONG-TERM CURRENT USE OF INSULIN (HCC): ICD-10-CM

## 2024-12-03 PROCEDURE — G8400 PT W/DXA NO RESULTS DOC: HCPCS | Performed by: ORTHOPAEDIC SURGERY

## 2024-12-03 PROCEDURE — 1159F MED LIST DOCD IN RCRD: CPT | Performed by: ORTHOPAEDIC SURGERY

## 2024-12-03 PROCEDURE — 1125F AMNT PAIN NOTED PAIN PRSNT: CPT | Performed by: ORTHOPAEDIC SURGERY

## 2024-12-03 PROCEDURE — 1090F PRES/ABSN URINE INCON ASSESS: CPT | Performed by: ORTHOPAEDIC SURGERY

## 2024-12-03 PROCEDURE — 99204 OFFICE O/P NEW MOD 45 MIN: CPT | Performed by: ORTHOPAEDIC SURGERY

## 2024-12-03 PROCEDURE — 1123F ACP DISCUSS/DSCN MKR DOCD: CPT | Performed by: ORTHOPAEDIC SURGERY

## 2024-12-03 PROCEDURE — 1160F RVW MEDS BY RX/DR IN RCRD: CPT | Performed by: ORTHOPAEDIC SURGERY

## 2024-12-03 PROCEDURE — 1036F TOBACCO NON-USER: CPT | Performed by: ORTHOPAEDIC SURGERY

## 2024-12-03 PROCEDURE — G8427 DOCREV CUR MEDS BY ELIG CLIN: HCPCS | Performed by: ORTHOPAEDIC SURGERY

## 2024-12-03 PROCEDURE — G8484 FLU IMMUNIZE NO ADMIN: HCPCS | Performed by: ORTHOPAEDIC SURGERY

## 2024-12-03 PROCEDURE — 3017F COLORECTAL CA SCREEN DOC REV: CPT | Performed by: ORTHOPAEDIC SURGERY

## 2024-12-03 PROCEDURE — G8417 CALC BMI ABV UP PARAM F/U: HCPCS | Performed by: ORTHOPAEDIC SURGERY

## 2024-12-03 NOTE — PROGRESS NOTES
Subjective:      Patient ID: Sindi Campos is a 69 y.o.  female.    Chief Complaint   Patient presents with    Ankle Injury     The patient is a pleasant 69-year-old female who presents today for with a 1 week history of right ankle pain and swelling that started spontaneously without any recurrent injury.  Patient states that she has been on her feet quite a bit for the holidays.  She had a nurse friend come over and evaluate her and she recommended coming here for an x-ray and formal orthopedic evaluation.  Patient states that she sustained a twisting injury to her ankle many years ago when her child was 11 years of age, and x-ray showed a possible avulsion fracture off of the lateral fibula.  Most of her pain and swelling currently is localized to the lateral aspect of the ankle.  Symptoms occur mostly in the morning when she arises, and pain improved somewhat as the day goes on.  She has been taking ibuprofen which has always helped.  X-rays of the right ankle were obtained yesterday that shows evidence of having had a fibula fracture, but there is no significant arthritic changes and the mortise is normal.  There are no acute changes present.    Social History     Occupational History    Not on file   Tobacco Use    Smoking status: Never    Smokeless tobacco: Never   Substance and Sexual Activity    Alcohol use: Not Currently    Drug use: Never    Sexual activity: Not on file      Past Medical History:  Past Medical History        Past Medical History:   Diagnosis Date    Asthma      Diabetes mellitus (HCC)      Hypertension      Multiple thyroid nodules      Sleep apnea       on CPAP            Past Surgical History:  Past Surgical History         Past Surgical History:   Procedure Laterality Date    COLONOSCOPY        COLONOSCOPY N/A 6/13/2023     COLONOSCOPY WITH BIOPSY performed by Ac Choudhary Jr., MD at [facility] ENDOSCOPY    TUBAL LIGATION                Family History:  Family

## 2024-12-03 NOTE — PROGRESS NOTES
Chief Complaint   Patient presents with    Ankle Injury     /80   Pulse 59   Temp 97.1 °F (36.2 °C)   Resp 16   Ht 1.753 m (5' 9\")   Wt 93 kg (205 lb)   SpO2 97%   BMI 30.27 kg/m²     1. Have you been to the ER, urgent care clinic since your last visit?  Hospitalized since your last visit?No    2. Have you seen or consulted any other health care providers outside of the [facility] since your last visit?  Include any pap smears or colon screening. No

## 2024-12-04 ENCOUNTER — TELEPHONE (OUTPATIENT)
Age: 69
End: 2024-12-04

## 2024-12-04 NOTE — TELEPHONE ENCOUNTER
PT called and requested AVS from OV w Dr. Son on 12/3/24 be printed and mailed to PO box address on file. AVS has been printed and mailed as of 12/4/24.

## 2024-12-05 LAB
T4 FREE SERPL-MCNC: 1.2 NG/DL (ref 0.8–1.5)
TSH SERPL DL<=0.05 MIU/L-ACNC: 0.58 UIU/ML (ref 0.36–3.74)

## 2024-12-16 ENCOUNTER — OFFICE VISIT (OUTPATIENT)
Age: 69
End: 2024-12-16
Payer: MEDICARE

## 2024-12-16 ENCOUNTER — HOSPITAL ENCOUNTER (OUTPATIENT)
Facility: HOSPITAL | Age: 69
Discharge: HOME | End: 2024-12-19
Attending: OBSTETRICS & GYNECOLOGY
Payer: MEDICARE

## 2024-12-16 VITALS
RESPIRATION RATE: 18 BRPM | SYSTOLIC BLOOD PRESSURE: 124 MMHG | HEART RATE: 56 BPM | OXYGEN SATURATION: 96 % | HEIGHT: 69 IN | BODY MASS INDEX: 39.55 KG/M2 | DIASTOLIC BLOOD PRESSURE: 61 MMHG | WEIGHT: 267 LBS

## 2024-12-16 DIAGNOSIS — Z01.419 GYNECOLOGIC EXAM NORMAL: Primary | ICD-10-CM

## 2024-12-16 DIAGNOSIS — Z12.31 VISIT FOR SCREENING MAMMOGRAM: ICD-10-CM

## 2024-12-16 DIAGNOSIS — Z12.4 ENCOUNTER FOR SCREENING FOR MALIGNANT NEOPLASM OF CERVIX: ICD-10-CM

## 2024-12-16 PROCEDURE — G8427 DOCREV CUR MEDS BY ELIG CLIN: HCPCS | Performed by: OBSTETRICS & GYNECOLOGY

## 2024-12-16 PROCEDURE — 77063 BREAST TOMOSYNTHESIS BI: CPT

## 2024-12-16 PROCEDURE — G8417 CALC BMI ABV UP PARAM F/U: HCPCS | Performed by: OBSTETRICS & GYNECOLOGY

## 2024-12-16 PROCEDURE — G0101 CA SCREEN;PELVIC/BREAST EXAM: HCPCS | Performed by: OBSTETRICS & GYNECOLOGY

## 2024-12-16 SDOH — ECONOMIC STABILITY: FOOD INSECURITY: WITHIN THE PAST 12 MONTHS, THE FOOD YOU BOUGHT JUST DIDN'T LAST AND YOU DIDN'T HAVE MONEY TO GET MORE.: NEVER TRUE

## 2024-12-16 SDOH — ECONOMIC STABILITY: FOOD INSECURITY: WITHIN THE PAST 12 MONTHS, YOU WORRIED THAT YOUR FOOD WOULD RUN OUT BEFORE YOU GOT MONEY TO BUY MORE.: NEVER TRUE

## 2024-12-16 SDOH — ECONOMIC STABILITY: INCOME INSECURITY: HOW HARD IS IT FOR YOU TO PAY FOR THE VERY BASICS LIKE FOOD, HOUSING, MEDICAL CARE, AND HEATING?: NOT HARD AT ALL

## 2024-12-16 ASSESSMENT — PATIENT HEALTH QUESTIONNAIRE - PHQ9
1. LITTLE INTEREST OR PLEASURE IN DOING THINGS: NOT AT ALL
SUM OF ALL RESPONSES TO PHQ QUESTIONS 1-9: 0
SUM OF ALL RESPONSES TO PHQ QUESTIONS 1-9: 0
2. FEELING DOWN, DEPRESSED OR HOPELESS: NOT AT ALL
SUM OF ALL RESPONSES TO PHQ QUESTIONS 1-9: 0
SUM OF ALL RESPONSES TO PHQ QUESTIONS 1-9: 0
SUM OF ALL RESPONSES TO PHQ9 QUESTIONS 1 & 2: 0

## 2024-12-16 NOTE — PROGRESS NOTES
\"Have you been to the ER, urgent care clinic since your last visit?  Hospitalized since your last visit?\"    NO    “Have you seen or consulted any other health care providers outside our system since your last visit?”    NO      “Have you had a diabetic eye exam?”    NO     Date of last diabetic eye exam: 6/7/2022       Click Here for Release of Records Request

## 2024-12-16 NOTE — PROGRESS NOTES
Sindi Campos is a 69 y.o. female, No obstetric history on file., No LMP recorded. Patient is postmenopausal., who presents today for the following:  Chief Complaint   Patient presents with   • Annual Exam        Allergies   Allergen Reactions   • Codeine Hives and Swelling       Current Outpatient Medications   Medication Sig Dispense Refill   • rosuvastatin (CRESTOR) 10 MG tablet TAKE 1 TABLET BY MOUTH EVERY DAY AT NIGHT 90 tablet 3   • apixaban (ELIQUIS) 5 MG TABS tablet Take 1 tablet by mouth 2 times daily     • Lancets (ONETOUCH DELICA PLUS ZSYRKI10T) MISC AS DIRECTED (E11.59) ONCE A DAY IN VITRO ONCE DAILY 90 DAYS     • levothyroxine (SYNTHROID) 75 MCG tablet Change to one pill daily except  sat and sun   ON EMPTY STOMACH, NO OTHER MEDS, FOOD, DRINKS FOR NEXT HALF HOUR 80 tablet 1   • metFORMIN (GLUCOPHAGE-XR) 750 MG extended release tablet One pill once  a  day   STOP metformin 500 mg dose pills 180 tablet 3   • albuterol sulfate HFA (VENTOLIN HFA) 108 (90 Base) MCG/ACT inhaler Inhale 2 puffs into the lungs 4 times daily as needed for Wheezing 18 g 0   • Multiple Vitamin (MULTIVITAMIN ADULT PO) Take by mouth daily     • blood glucose test strips (ONETOUCH ULTRA) strip USE AS DIRECTED ONCE DAILY. Dx code E11.65 100 each 3   • cyanocobalamin 1000 MCG tablet Take 1 tablet by mouth daily     • dilTIAZem (TIAZAC) 240 MG extended release capsule Take by mouth daily     • fluticasone (FLONASE) 50 MCG/ACT nasal spray 2 sprays by Nasal route daily     • latanoprost (XALATAN) 0.005 % ophthalmic solution Apply 1 drop to eye     • levocetirizine (XYZAL) 5 MG tablet Take 1 tablet by mouth as needed     • losartan-hydroCHLOROthiazide (HYZAAR) 100-12.5 MG per tablet Take 1 tablet by mouth daily Split tablet in half     • metoprolol succinate (TOPROL XL) 100 MG extended release tablet Take by mouth daily     • pantoprazole (PROTONIX) 40 MG tablet Take 1 tablet by mouth daily     • Timolol (TIMOPTIC) 0.5 % SOLN ophthalmic

## 2024-12-19 LAB
., LABCORP: NORMAL
CYTOLOGIST CVX/VAG CYTO: NORMAL
CYTOLOGY CVX/VAG DOC CYTO: NORMAL
CYTOLOGY CVX/VAG DOC THIN PREP: NORMAL
DX ICD CODE: NORMAL
Lab: NORMAL
OTHER STN SPEC: NORMAL
STAT OF ADQ CVX/VAG CYTO-IMP: NORMAL

## 2025-03-06 DIAGNOSIS — E03.8 OTHER SPECIFIED HYPOTHYROIDISM: ICD-10-CM

## 2025-03-06 DIAGNOSIS — E11.65 TYPE 2 DIABETES MELLITUS WITH HYPERGLYCEMIA, WITHOUT LONG-TERM CURRENT USE OF INSULIN (HCC): ICD-10-CM

## 2025-03-06 DIAGNOSIS — E04.2 MULTINODULAR GOITER: ICD-10-CM

## 2025-03-06 DIAGNOSIS — E78.2 MIXED HYPERLIPIDEMIA: ICD-10-CM

## 2025-03-07 RX ORDER — LEVOTHYROXINE SODIUM 75 UG/1
TABLET ORAL
Qty: 60 TABLET | Refills: 3 | Status: SHIPPED | OUTPATIENT
Start: 2025-03-07

## 2025-03-16 DIAGNOSIS — E11.65 TYPE 2 DIABETES MELLITUS WITH HYPERGLYCEMIA, WITHOUT LONG-TERM CURRENT USE OF INSULIN (HCC): ICD-10-CM

## 2025-03-16 DIAGNOSIS — E03.8 OTHER SPECIFIED HYPOTHYROIDISM: ICD-10-CM

## 2025-03-16 DIAGNOSIS — E78.2 MIXED HYPERLIPIDEMIA: ICD-10-CM

## 2025-03-16 DIAGNOSIS — E04.2 MULTINODULAR GOITER: ICD-10-CM

## 2025-03-17 RX ORDER — LEVOTHYROXINE SODIUM 75 UG/1
TABLET ORAL
Qty: 80 TABLET | Refills: 1 | OUTPATIENT
Start: 2025-03-17

## 2025-04-01 ENCOUNTER — LAB (OUTPATIENT)
Age: 70
End: 2025-04-01

## 2025-04-01 DIAGNOSIS — E04.2 MULTINODULAR GOITER: ICD-10-CM

## 2025-04-01 DIAGNOSIS — E03.8 OTHER SPECIFIED HYPOTHYROIDISM: ICD-10-CM

## 2025-04-01 DIAGNOSIS — E11.65 TYPE 2 DIABETES MELLITUS WITH HYPERGLYCEMIA, WITHOUT LONG-TERM CURRENT USE OF INSULIN (HCC): ICD-10-CM

## 2025-04-01 DIAGNOSIS — E05.90 HYPERTHYROIDISM: ICD-10-CM

## 2025-04-01 DIAGNOSIS — I48.11 LONGSTANDING PERSISTENT ATRIAL FIBRILLATION (HCC): ICD-10-CM

## 2025-04-01 DIAGNOSIS — E78.2 MIXED HYPERLIPIDEMIA: ICD-10-CM

## 2025-04-02 LAB
ALBUMIN SERPL-MCNC: 3.9 G/DL (ref 3.5–5)
ALBUMIN/GLOB SERPL: 1.1 (ref 1.1–2.2)
ALP SERPL-CCNC: 121 U/L (ref 45–117)
ALT SERPL-CCNC: 19 U/L (ref 12–78)
ANION GAP SERPL CALC-SCNC: 6 MMOL/L (ref 2–12)
AST SERPL-CCNC: 12 U/L (ref 15–37)
BILIRUB SERPL-MCNC: 0.5 MG/DL (ref 0.2–1)
BUN SERPL-MCNC: 13 MG/DL (ref 6–20)
BUN/CREAT SERPL: 25 (ref 12–20)
CALCIUM SERPL-MCNC: 9.9 MG/DL (ref 8.5–10.1)
CHLORIDE SERPL-SCNC: 104 MMOL/L (ref 97–108)
CHOLEST SERPL-MCNC: 125 MG/DL
CO2 SERPL-SCNC: 30 MMOL/L (ref 21–32)
CREAT SERPL-MCNC: 0.51 MG/DL (ref 0.55–1.02)
CREAT UR-MCNC: 128 MG/DL
EST. AVERAGE GLUCOSE BLD GHB EST-MCNC: 131 MG/DL
GLOBULIN SER CALC-MCNC: 3.6 G/DL (ref 2–4)
GLUCOSE SERPL-MCNC: 120 MG/DL (ref 65–100)
HBA1C MFR BLD: 6.2 % (ref 4–5.6)
HDLC SERPL-MCNC: 47 MG/DL
HDLC SERPL: 2.7 (ref 0–5)
LDLC SERPL CALC-MCNC: 58 MG/DL (ref 0–100)
MICROALBUMIN UR-MCNC: 1.03 MG/DL
MICROALBUMIN/CREAT UR-RTO: 8 MG/G (ref 0–30)
POTASSIUM SERPL-SCNC: 4.3 MMOL/L (ref 3.5–5.1)
PROT SERPL-MCNC: 7.5 G/DL (ref 6.4–8.2)
SODIUM SERPL-SCNC: 140 MMOL/L (ref 136–145)
T4 FREE SERPL-MCNC: 1.1 NG/DL (ref 0.8–1.5)
TRIGL SERPL-MCNC: 100 MG/DL
TSH SERPL DL<=0.05 MIU/L-ACNC: 0.97 UIU/ML (ref 0.36–3.74)
VLDLC SERPL CALC-MCNC: 20 MG/DL

## 2025-05-02 ENCOUNTER — OFFICE VISIT (OUTPATIENT)
Age: 70
End: 2025-05-02
Payer: MEDICARE

## 2025-05-02 VITALS
HEART RATE: 78 BPM | HEIGHT: 69 IN | DIASTOLIC BLOOD PRESSURE: 73 MMHG | SYSTOLIC BLOOD PRESSURE: 142 MMHG | BODY MASS INDEX: 30.72 KG/M2 | OXYGEN SATURATION: 98 % | TEMPERATURE: 97.3 F | WEIGHT: 207.4 LBS

## 2025-05-02 DIAGNOSIS — E11.65 TYPE 2 DIABETES MELLITUS WITH HYPERGLYCEMIA, WITHOUT LONG-TERM CURRENT USE OF INSULIN (HCC): ICD-10-CM

## 2025-05-02 DIAGNOSIS — E03.8 OTHER SPECIFIED HYPOTHYROIDISM: ICD-10-CM

## 2025-05-02 DIAGNOSIS — E78.2 MIXED HYPERLIPIDEMIA: ICD-10-CM

## 2025-05-02 DIAGNOSIS — E04.2 MULTINODULAR GOITER: ICD-10-CM

## 2025-05-02 PROCEDURE — 1036F TOBACCO NON-USER: CPT | Performed by: INTERNAL MEDICINE

## 2025-05-02 PROCEDURE — 1125F AMNT PAIN NOTED PAIN PRSNT: CPT | Performed by: INTERNAL MEDICINE

## 2025-05-02 PROCEDURE — 3044F HG A1C LEVEL LT 7.0%: CPT | Performed by: INTERNAL MEDICINE

## 2025-05-02 PROCEDURE — 99214 OFFICE O/P EST MOD 30 MIN: CPT | Performed by: INTERNAL MEDICINE

## 2025-05-02 PROCEDURE — 1123F ACP DISCUSS/DSCN MKR DOCD: CPT | Performed by: INTERNAL MEDICINE

## 2025-05-02 PROCEDURE — 2022F DILAT RTA XM EVC RTNOPTHY: CPT | Performed by: INTERNAL MEDICINE

## 2025-05-02 PROCEDURE — G8400 PT W/DXA NO RESULTS DOC: HCPCS | Performed by: INTERNAL MEDICINE

## 2025-05-02 PROCEDURE — G8417 CALC BMI ABV UP PARAM F/U: HCPCS | Performed by: INTERNAL MEDICINE

## 2025-05-02 PROCEDURE — 3017F COLORECTAL CA SCREEN DOC REV: CPT | Performed by: INTERNAL MEDICINE

## 2025-05-02 PROCEDURE — 1090F PRES/ABSN URINE INCON ASSESS: CPT | Performed by: INTERNAL MEDICINE

## 2025-05-02 PROCEDURE — G8427 DOCREV CUR MEDS BY ELIG CLIN: HCPCS | Performed by: INTERNAL MEDICINE

## 2025-05-02 RX ORDER — LEVOTHYROXINE SODIUM 75 UG/1
TABLET ORAL
Qty: 60 TABLET | Refills: 3 | Status: SHIPPED | OUTPATIENT
Start: 2025-05-02

## 2025-05-02 RX ORDER — ROSUVASTATIN CALCIUM 10 MG/1
10 TABLET, COATED ORAL NIGHTLY
Qty: 90 TABLET | Refills: 3 | Status: SHIPPED | OUTPATIENT
Start: 2025-05-02

## 2025-05-02 RX ORDER — METFORMIN HYDROCHLORIDE 750 MG/1
TABLET, EXTENDED RELEASE ORAL
Qty: 90 TABLET | Refills: 3 | Status: SHIPPED | OUTPATIENT
Start: 2025-05-02

## 2025-05-02 NOTE — PATIENT INSTRUCTIONS
SPECIFIC INSTRUCTIONS BELOW         metformin  750   mg once a day  with dinner       ---------------------------------------------------------    Levothyroxine    75 mcg  daily except  on  sat   and  sun , on empty stomach with water only, no other meds or food or drinks   For next half hour   Take any kind of vitamins, calcium, iron   Pills  4 hours later        -------------PAY ATTENTION TO THESE GENERAL INSTRUCTIONS -----------------      - The medications prescribed at this visit will not be available at pharmacy until 6 pm       - YOUR MED LIST IS NOT UP TO DATE AS SOME CHANGES ARE BEING MADE AFTER THE VISIT - FOLLOW SPECIFIC INSTRUCTIONS  ABOVE     -ANY tests other than blood work, which you opt to do  outside the  [facility] imaging facilities, you are responsible for prior authorizations if  required    - HEALTH MAINTENANCE IS NOT GOING TO BE UP TO DATE ON YOUR AVS- PLEASE IGNORE     Results     *Normal results will not be notified by a phone call starting January 1 2021   *If you have an upcoming visit, the results will be discussed at the visit   *Please sign up for MY CHART if you want access to your lab and test results  *Abnormal results which require immediate attention will be notified by phone call   *Abnormal results which do not require immediate assistance will be notified in 1-2 weeks       Refills    -    have your pharmacy send us a refill request . Refills are done max for one year and a visit is a must before refills are extended    Follow up appointments -  highly encourage you to make it when you are checking out. We can accommodate you into the schedule based on your clinical situation, but not for extending refills beyond a year. Labs are important to give refills and is important to get labs before the visit     Phone calls  -  Allow  24 hrs. for non-urgent calls to be returned  Prior authorization - It may take 2-4 weeks to process  Forms  -  FMLA, DMV etc., will take up to 2 weeks

## 2025-05-02 NOTE — PROGRESS NOTES
Sindi Campos is a 69 y.o. female here for   Chief Complaint   Patient presents with    Diabetes    Thyroid Problem       1. Have you been to the ER, urgent care clinic since your last visit?  Hospitalized since your last visit? -No    2. Have you seen or consulted any other health care providers outside of the [facility] since your last visit?  Include any pap smears or colon screening.-Dr. Dulce Cope

## 2025-05-02 NOTE — PROGRESS NOTES
[facility] DIABETES AND ENDOCRINOLOGY              Chelsey Gilmore MD FACE            Sindi Campos is a 69 y.o.  female presents today as a follow up           Patient here for  6 M   follow up AFTER  Last  visit  for Type 2 diabetes mellitus   And     Hypothyroidism   And MNG    From October 2024     Patient is a very pleasant   Doing well        October 2024  She could not  pay  for   jardiance  because of  cost  recently oct 2024   She  is  on  eliquis  for atrial fib   She is asymptomatic  of compressive symptoms       October 2023    Reports no compressive symptoms        Nov 2020    Referred : by Dr Haider    H/o diabetes for 10  years    Current A1C is <7 %  and symptoms/problems include none    Current diabetic medications include metformin    Patient has always been that conscious, and exercising by which she is kept her diabetes in very good control.   She got the log and the sugars are good   She has had multinodular goiter for several years and she had seen that it was growing and Dr. Hawkins had her go for biopsy which turned out to be negative for cancer   Patient denies any discomfort or dysphagia from goiter             Review of Systems    None          Physical Exam    Constitutional: She is oriented to person, place, and time. She appears well-developed and well-nourished.    Neck:  thyromegaly present.    Psychiatric: She has a normal mood and affect.         Labs    Lab Results   Component Value Date    LABA1C 6.2 (H) 04/01/2025    LABA1C 6.4 (H) 10/01/2024    LABA1C 6.3 (H) 10/02/2023       Lab Results   Component Value Date     04/01/2025    K 4.3 04/01/2025     04/01/2025    CO2 30 04/01/2025    BUN 13 04/01/2025    CREATININE 0.51 (L) 04/01/2025    GLUCOSE 120 (H) 04/01/2025    CALCIUM 9.9 04/01/2025    BILITOT 0.5 04/01/2025    ALKPHOS 121 (H) 04/01/2025    AST 12 (L) 04/01/2025    ALT 19 04/01/2025    LABGLOM >90 04/01/2025    GFRAA 106 03/01/2021    AGRATIO 1.8

## 2025-05-02 NOTE — PROGRESS NOTES
Sindi Campos is a 69 y.o. female here for   Chief Complaint   Patient presents with    Diabetes    Thyroid Problem       1. Have you been to the ER, urgent care clinic since your last visit?  Hospitalized since your last visit? -    2. Have you seen or consulted any other health care providers outside of the [facility] since your last visit?  Include any pap smears or colon screening.-

## 2025-05-03 ENCOUNTER — APPOINTMENT (OUTPATIENT)
Facility: HOSPITAL | Age: 70
End: 2025-05-03
Payer: MEDICARE

## 2025-05-03 ENCOUNTER — HOSPITAL ENCOUNTER (EMERGENCY)
Facility: HOSPITAL | Age: 70
Discharge: HOME | End: 2025-05-03
Attending: FAMILY MEDICINE
Payer: MEDICARE

## 2025-05-03 VITALS
OXYGEN SATURATION: 95 % | DIASTOLIC BLOOD PRESSURE: 63 MMHG | BODY MASS INDEX: 30.66 KG/M2 | TEMPERATURE: 97.7 F | HEART RATE: 60 BPM | HEIGHT: 69 IN | SYSTOLIC BLOOD PRESSURE: 156 MMHG | WEIGHT: 207 LBS | RESPIRATION RATE: 18 BRPM

## 2025-05-03 DIAGNOSIS — N30.00 ACUTE CYSTITIS WITHOUT HEMATURIA: Primary | ICD-10-CM

## 2025-05-03 LAB
ALBUMIN SERPL-MCNC: 3.4 G/DL (ref 3.5–5)
ALBUMIN/GLOB SERPL: 0.8 (ref 1.1–2.2)
ALP SERPL-CCNC: 108 U/L (ref 45–117)
ALT SERPL-CCNC: 17 U/L (ref 12–78)
ANION GAP SERPL CALC-SCNC: 8 MMOL/L (ref 2–12)
APPEARANCE UR: ABNORMAL
AST SERPL W P-5'-P-CCNC: 10 U/L (ref 15–37)
BACTERIA URNS QL MICRO: NEGATIVE /HPF
BASOPHILS # BLD: 0.02 K/UL (ref 0–0.1)
BASOPHILS NFR BLD: 0.3 % (ref 0–1)
BILIRUB SERPL-MCNC: 0.5 MG/DL (ref 0.2–1)
BILIRUB UR QL: NEGATIVE
BUN SERPL-MCNC: 11 MG/DL (ref 6–20)
BUN/CREAT SERPL: 19 (ref 12–20)
CA-I BLD-MCNC: 9.2 MG/DL (ref 8.5–10.1)
CHLORIDE SERPL-SCNC: 101 MMOL/L (ref 97–108)
CO2 SERPL-SCNC: 30 MMOL/L (ref 21–32)
COLOR UR: YELLOW
CREAT SERPL-MCNC: 0.57 MG/DL (ref 0.55–1.02)
DIFFERENTIAL METHOD BLD: NORMAL
EOSINOPHIL # BLD: 0.13 K/UL (ref 0–0.4)
EOSINOPHIL NFR BLD: 1.9 % (ref 0–7)
EPITH CASTS URNS QL MICRO: ABNORMAL /LPF
ERYTHROCYTE [DISTWIDTH] IN BLOOD BY AUTOMATED COUNT: 13.4 % (ref 11.5–14.5)
GLOBULIN SER CALC-MCNC: 4.1 G/DL (ref 2–4)
GLUCOSE SERPL-MCNC: 124 MG/DL (ref 65–100)
GLUCOSE UR STRIP.AUTO-MCNC: NEGATIVE MG/DL
HCT VFR BLD AUTO: 41.8 % (ref 35–47)
HGB BLD-MCNC: 13.5 G/DL (ref 11.5–16)
HGB UR QL STRIP: NEGATIVE
IMM GRANULOCYTES # BLD AUTO: 0 K/UL (ref 0–0.04)
IMM GRANULOCYTES NFR BLD AUTO: 0 % (ref 0–0.5)
KETONES UR QL STRIP.AUTO: NEGATIVE MG/DL
LEUKOCYTE ESTERASE UR QL STRIP.AUTO: ABNORMAL
LIPASE SERPL-CCNC: 21 U/L (ref 13–75)
LYMPHOCYTES # BLD: 2.35 K/UL (ref 0.8–3.5)
LYMPHOCYTES NFR BLD: 34.6 % (ref 12–49)
MCH RBC QN AUTO: 29.1 PG (ref 26–34)
MCHC RBC AUTO-ENTMCNC: 32.3 G/DL (ref 30–36.5)
MCV RBC AUTO: 90.1 FL (ref 80–99)
MONOCYTES # BLD: 0.69 K/UL (ref 0–1)
MONOCYTES NFR BLD: 10.2 % (ref 5–13)
NEUTS SEG # BLD: 3.6 K/UL (ref 1.8–8)
NEUTS SEG NFR BLD: 53 % (ref 32–75)
NITRITE UR QL STRIP.AUTO: NEGATIVE
PH UR STRIP: 6.5 (ref 5–8)
PLATELET # BLD AUTO: 252 K/UL (ref 150–400)
PMV BLD AUTO: 8.9 FL (ref 8.9–12.9)
POTASSIUM SERPL-SCNC: 3.8 MMOL/L (ref 3.5–5.1)
PROT SERPL-MCNC: 7.5 G/DL (ref 6.4–8.2)
PROT UR STRIP-MCNC: NEGATIVE MG/DL
RBC # BLD AUTO: 4.64 M/UL (ref 3.8–5.2)
RBC #/AREA URNS HPF: ABNORMAL /HPF (ref 0–5)
SODIUM SERPL-SCNC: 139 MMOL/L (ref 136–145)
SP GR UR REFRACTOMETRY: 1.01 (ref 1–1.03)
URINE CULTURE IF INDICATED: ABNORMAL
UROBILINOGEN UR QL STRIP.AUTO: 0.1 EU/DL (ref 0.2–1)
WBC # BLD AUTO: 6.8 K/UL (ref 3.6–11)
WBC URNS QL MICRO: ABNORMAL /HPF (ref 0–4)

## 2025-05-03 PROCEDURE — 6370000000 HC RX 637 (ALT 250 FOR IP)

## 2025-05-03 PROCEDURE — 81001 URINALYSIS AUTO W/SCOPE: CPT

## 2025-05-03 PROCEDURE — 6360000004 HC RX CONTRAST MEDICATION

## 2025-05-03 PROCEDURE — 87086 URINE CULTURE/COLONY COUNT: CPT

## 2025-05-03 PROCEDURE — 80053 COMPREHEN METABOLIC PANEL: CPT

## 2025-05-03 PROCEDURE — 99285 EMERGENCY DEPT VISIT HI MDM: CPT

## 2025-05-03 PROCEDURE — 74177 CT ABD & PELVIS W/CONTRAST: CPT

## 2025-05-03 PROCEDURE — 93005 ELECTROCARDIOGRAM TRACING: CPT

## 2025-05-03 PROCEDURE — 83690 ASSAY OF LIPASE: CPT

## 2025-05-03 PROCEDURE — 85025 COMPLETE CBC W/AUTO DIFF WBC: CPT

## 2025-05-03 RX ORDER — CEPHALEXIN 500 MG/1
500 CAPSULE ORAL 2 TIMES DAILY
Qty: 14 CAPSULE | Refills: 0 | Status: SHIPPED | OUTPATIENT
Start: 2025-05-03 | End: 2025-05-10

## 2025-05-03 RX ORDER — CEPHALEXIN 500 MG/1
500 CAPSULE ORAL ONCE
Status: COMPLETED | OUTPATIENT
Start: 2025-05-03 | End: 2025-05-03

## 2025-05-03 RX ORDER — IOPAMIDOL 755 MG/ML
100 INJECTION, SOLUTION INTRAVASCULAR ONCE
Status: COMPLETED | OUTPATIENT
Start: 2025-05-03 | End: 2025-05-03

## 2025-05-03 RX ADMIN — IOPAMIDOL 100 ML: 755 INJECTION, SOLUTION INTRAVENOUS at 11:13

## 2025-05-03 RX ADMIN — CEPHALEXIN 500 MG: 500 CAPSULE ORAL at 11:41

## 2025-05-03 ASSESSMENT — PAIN - FUNCTIONAL ASSESSMENT: PAIN_FUNCTIONAL_ASSESSMENT: 0-10

## 2025-05-03 ASSESSMENT — PAIN DESCRIPTION - LOCATION: LOCATION: BACK

## 2025-05-03 ASSESSMENT — PAIN DESCRIPTION - ORIENTATION: ORIENTATION: RIGHT

## 2025-05-03 ASSESSMENT — PAIN SCALES - GENERAL: PAINLEVEL_OUTOF10: 9

## 2025-05-03 NOTE — ED PROVIDER NOTES
[facility] EMERGENCY DEPT  EMERGENCY DEPARTMENT HISTORY AND PHYSICAL EXAM      Date of evaluation: 5/3/2025  Patient Name: Sindi Campos  Birthdate 1955  MRN: [id number]  ED Provider: KASSI Ewing   Note Started: 9:28 AM EDT 5/3/25    HISTORY OF PRESENT ILLNESS     Chief Complaint   Patient presents with    Urinary Frequency    Back Pain       History Provided By: Patient, only     HPI: Sindi Campos is a 69 y.o. female with past medical history as listed below, including diabetes mellitus and atrial fibrillation on diltiazem and Eliquis, who presents to the ED complaining of abdominal pain x 1 week. She localizes the pain to her lower abdomen with some radiation into her back, hips, and down her legs. She also reports increased urinary frequency and feeling feverish yesterday. She denies any chest pain, shortness of breath, hematuria, or nausea/vomiting/diarrhea. She took one fluconazole yesterday that she had leftover from a previous UTI.     She also reports cough and nasal congestion which started yesterday. She states that she has a history of asthma as well and has an albuterol inhaler at home she uses as needed.     PAST MEDICAL HISTORY   Past Medical History:  Past Medical History:   Diagnosis Date    Asthma     Diabetes mellitus (HCC)     Hypertension     Multiple thyroid nodules     Sleep apnea     on CPAP       Past Surgical History:  Past Surgical History:   Procedure Laterality Date    COLONOSCOPY      COLONOSCOPY N/A 6/13/2023    COLONOSCOPY WITH BIOPSY performed by Ac Choudhary Jr., MD at [facility] ENDOSCOPY    TUBAL LIGATION         Family History:  No family history on file.    Social History:  Social History     Tobacco Use    Smoking status: Never    Smokeless tobacco: Never   Vaping Use    Vaping status: Never Used   Substance Use Topics    Alcohol use: Not Currently    Drug use: Never       Allergies:  Allergies   Allergen Reactions    Codeine Hives and Swelling       PCP: Dulce Cope,  at L[facility].   [MM]      ED Course User Index  [MM] Liliana Gordon, PA       Clinical Management Tools:  Not Applicable    Smoking Cessation: Not Applicable    Patient was given the following medications:  Medications   iopamidol (ISOVUE-370) 76 % injection 100 mL (100 mLs IntraVENous Given 5/3/25 1113)   cephALEXin (KEFLEX) capsule 500 mg (500 mg Oral Given 5/3/25 1141)       CONSULTS: See ED Course/MDM for further details.  None   PROCEDURES   Unless otherwise noted above, none  Procedures    SEPSIS REASSESSMENT & CRITICAL CARE TIME   SEPSIS REASSESSMENT: Patient does NOT meet Sepsis criteria after ED workup    Patient does not meet Critical Care Time, 0 minutes  CLINICAL IMPRESSIONS     1. Acute cystitis without hematuria       SDOH/DISPOSITION/PLAN   Social Determinants affecting Treatment Plan: None    DISPOSITION Decision To Discharge 05/03/2025 11:46:23 AM         Discharge Note: The patient is stable for discharge home. The signs, symptoms, diagnosis, and discharge instructions have been discussed, understanding conveyed, and agreed upon. The patient is to follow up as recommended or return to ER should their symptoms worsen.      PATIENT REFERRED TO:  [facility]  [address]  [facility] [id number] [phone]  Go to   If symptoms worsen    Dulce Cope MD  [address] [facility]  [address]  [facility] [address] [phone]    Schedule an appointment as soon as possible for a visit in 3 days  For follow up of ER visit        DISCHARGE MEDICATIONS:     Medication List        START taking these medications      cephALEXin 500 MG capsule  Commonly known as: KEFLEX  Take 1 capsule by mouth 2 times daily for 7 days            CONTINUE taking these medications      OneTouch Delica Plus [id number] Misc            ASK your doctor about these medications      albuterol sulfate  (90 Base) MCG/ACT inhaler  Commonly known as: Ventolin HFA  Inhale 2 puffs into the lungs 4

## 2025-05-03 NOTE — ED TRIAGE NOTES
Reports that she has pain in her right buttock that goes down her leg & around to the left side of her abdomen.  Reports pain began earlier this week.  Also reports urinary frequency.

## 2025-05-03 NOTE — DISCHARGE INSTRUCTIONS
Thank you for choosing our Emergency Department for your care.  It is our privilege to care for you in your time of need.  In the next several days, you may receive a survey via email or mailed to your home about your experience with our team.  We would greatly appreciate you taking a few minutes to complete the survey, as we use this information to learn what we have done well and what we could be doing better. Thank you for trusting us with your care!    Below you will find a list of your tests from today's visit.   Labs and Radiology Studies  Recent Results (from the past 12 hours)   Urinalysis with Reflex to Culture    Collection Time: 05/03/25  9:30 AM    Specimen: Urine   Result Value Ref Range    Color, UA Yellow      Appearance Cloudy (A) Clear      Specific Gravity, UA 1.010 1.003 - 1.030      pH, Urine 6.5 5.0 - 8.0      Protein, UA Negative Negative mg/dL    Glucose, Ur Negative Negative mg/dL    Ketones, Urine Negative Negative mg/dL    Bilirubin, Urine Negative Negative      Blood, Urine Negative Negative      Urobilinogen, Urine 0.1 (L) 0.2 - 1.0 EU/dL    Nitrite, Urine Negative Negative      Leukocyte Esterase, Urine Small (A) Negative      WBC, UA 10-20 0 - 4 /hpf    RBC, UA 0-5 0 - 5 /hpf    Epithelial Cells, UA Few Few /lpf    BACTERIA, URINE Negative Negative /hpf    Urine Culture if Indicated Urine Culture Ordered (A) Culture not indicated by UA result     CBC with Auto Differential    Collection Time: 05/03/25 10:05 AM   Result Value Ref Range    WBC 6.8 3.6 - 11.0 K/uL    RBC 4.64 3.80 - 5.20 M/uL    Hemoglobin 13.5 11.5 - 16.0 g/dL    Hematocrit 41.8 35.0 - 47.0 %    MCV 90.1 80.0 - 99.0 FL    MCH 29.1 26.0 - 34.0 PG    MCHC 32.3 30.0 - 36.5 g/dL    RDW 13.4 11.5 - 14.5 %    Platelets 252 150 - 400 K/uL    MPV 8.9 8.9 - 12.9 FL    Neutrophils % 53.0 32.0 - 75.0 %    Lymphocytes % 34.6 12.0 - 49.0 %    Monocytes % 10.2 5.0 - 13.0 %    Eosinophils % 1.9 0.0 - 7.0 %    Basophils % 0.3 0.0 -  1.0 %    Immature Granulocytes % 0.0 0.0 - 0.5 %    Neutrophils Absolute 3.60 1.80 - 8.00 K/UL    Lymphocytes Absolute 2.35 0.80 - 3.50 K/UL    Monocytes Absolute 0.69 0.00 - 1.00 K/UL    Eosinophils Absolute 0.13 0.00 - 0.40 K/UL    Basophils Absolute 0.02 0.00 - 0.10 K/UL    Immature Granulocytes Absolute 0.00 0.00 - 0.04 K/UL    Differential Type AUTOMATED     Comprehensive Metabolic Panel    Collection Time: 05/03/25 10:05 AM   Result Value Ref Range    Sodium 139 136 - 145 mmol/L    Potassium 3.8 3.5 - 5.1 mmol/L    Chloride 101 97 - 108 mmol/L    CO2 30 21 - 32 mmol/L    Anion Gap 8 2 - 12 mmol/L    Glucose 124 (H) 65 - 100 mg/dL    BUN 11 6 - 20 mg/dL    Creatinine 0.57 0.55 - 1.02 mg/dL    BUN/Creatinine Ratio 19 12 - 20      Est, Glom Filt Rate >90 >60 ml/min/1.73m2    Calcium 9.2 8.5 - 10.1 mg/dL    Total Bilirubin 0.5 0.2 - 1.0 mg/dL    AST 10 (L) 15 - 37 U/L    ALT 17 12 - 78 U/L    Alk Phosphatase 108 45 - 117 U/L    Total Protein 7.5 6.4 - 8.2 g/dL    Albumin 3.4 (L) 3.5 - 5.0 g/dL    Globulin 4.1 (H) 2.0 - 4.0 g/dL    Albumin/Globulin Ratio 0.8 (L) 1.1 - 2.2     Lipase    Collection Time: 05/03/25 10:05 AM   Result Value Ref Range    Lipase 21 13 - 75 U/L     CT ABDOMEN PELVIS W IV CONTRAST Additional Contrast? None  Result Date: 5/3/2025  CLINICAL HISTORY: abdominal pain INDICATION: abdominal pain, back pain, urinary frequency COMPARISON: [None]. CONTRAST: [100] ml Isovue 370 TECHNIQUE: Multislice helical CT was performed of the abdomen and pelvis  following uneventful rapid bolus intravenous contrast administration.  Oral contrast was not administered. Contiguous 5 mm axial images were reconstructed and lung and soft tissue windows were generated. Coronal and sagittal reformations were generated.  CT dose reduction was achieved through use of a standardized protocol tailored for this examination and automatic exposure control for dose modulation.   FINDINGS: LUNG BASES:No mass lesion or effusion.

## 2025-05-04 LAB
BACTERIA SPEC CULT: NORMAL
COLONY COUNT, CNT: NORMAL
COLONY COUNT, CNT: NORMAL
EKG ATRIAL RATE: 50 BPM
EKG DIAGNOSIS: NORMAL
EKG P AXIS: 51 DEGREES
EKG P-R INTERVAL: 172 MS
EKG Q-T INTERVAL: 424 MS
EKG QRS DURATION: 78 MS
EKG QTC CALCULATION (BAZETT): 386 MS
EKG R AXIS: 33 DEGREES
EKG T AXIS: 66 DEGREES
EKG VENTRICULAR RATE: 50 BPM
Lab: NORMAL

## 2025-06-12 ENCOUNTER — TRANSCRIBE ORDERS (OUTPATIENT)
Facility: HOSPITAL | Age: 70
End: 2025-06-12

## 2025-06-12 ENCOUNTER — OFFICE VISIT (OUTPATIENT)
Age: 70
End: 2025-06-12

## 2025-06-12 ENCOUNTER — HOSPITAL ENCOUNTER (OUTPATIENT)
Facility: HOSPITAL | Age: 70
Discharge: HOME | End: 2025-06-15
Payer: MEDICARE

## 2025-06-12 DIAGNOSIS — M17.12 PRIMARY OSTEOARTHRITIS OF LEFT KNEE: Primary | ICD-10-CM

## 2025-06-12 DIAGNOSIS — G89.29 CHRONIC PATELLOFEMORAL PAIN OF LEFT KNEE: ICD-10-CM

## 2025-06-12 DIAGNOSIS — R06.02 SHORTNESS OF BREATH: Primary | ICD-10-CM

## 2025-06-12 DIAGNOSIS — R06.02 SHORTNESS OF BREATH: ICD-10-CM

## 2025-06-12 DIAGNOSIS — M25.562 CHRONIC PATELLOFEMORAL PAIN OF LEFT KNEE: ICD-10-CM

## 2025-06-12 DIAGNOSIS — M17.12: ICD-10-CM

## 2025-06-12 DIAGNOSIS — M25.462 EFFUSION OF LEFT KNEE JOINT: ICD-10-CM

## 2025-06-12 PROCEDURE — 71046 X-RAY EXAM CHEST 2 VIEWS: CPT

## 2025-06-12 RX ORDER — TRIAMCINOLONE ACETONIDE 40 MG/ML
40 INJECTION, SUSPENSION INTRA-ARTICULAR; INTRAMUSCULAR ONCE
Status: COMPLETED | OUTPATIENT
Start: 2025-06-12 | End: 2025-06-12

## 2025-06-12 RX ADMIN — TRIAMCINOLONE ACETONIDE 40 MG: 40 INJECTION, SUSPENSION INTRA-ARTICULAR; INTRAMUSCULAR at 12:26

## 2025-06-12 ASSESSMENT — PATIENT HEALTH QUESTIONNAIRE - PHQ9
SUM OF ALL RESPONSES TO PHQ QUESTIONS 1-9: 0
SUM OF ALL RESPONSES TO PHQ QUESTIONS 1-9: 0
1. LITTLE INTEREST OR PLEASURE IN DOING THINGS: NOT AT ALL
2. FEELING DOWN, DEPRESSED OR HOPELESS: NOT AT ALL
SUM OF ALL RESPONSES TO PHQ QUESTIONS 1-9: 0
SUM OF ALL RESPONSES TO PHQ QUESTIONS 1-9: 0

## 2025-06-12 NOTE — PROGRESS NOTES
10 mL of 1% lidocaine.  An 18-gauge needle was introduced and approximately 45 mL of clear yellow fluid was aspirated from the joint.  Next, a mixture of 4 mL of 0.25% Marcaine and 1 mL Kenalog was injected into the knee joint.  No complications and no bleeding.  A Band-Aid was placed.    A patellofemoral knee sleeve was provided today.    Advised heating pad over the left knee periodically.  All questions were answered.  Follow-up as needed.

## 2025-07-22 ENCOUNTER — TRANSCRIBE ORDERS (OUTPATIENT)
Facility: HOSPITAL | Age: 70
End: 2025-07-22

## 2025-07-22 DIAGNOSIS — E66.9 OBESITY, UNSPECIFIED CLASS, UNSPECIFIED OBESITY TYPE, UNSPECIFIED WHETHER SERIOUS COMORBIDITY PRESENT: Primary | ICD-10-CM

## 2025-07-22 DIAGNOSIS — K76.0 FATTY METAMORPHOSIS OF LIVER: ICD-10-CM

## 2025-08-06 ENCOUNTER — TRANSCRIBE ORDERS (OUTPATIENT)
Facility: HOSPITAL | Age: 70
End: 2025-08-06

## 2025-08-06 DIAGNOSIS — E11.8 CONTROLLED DIABETES MELLITUS TYPE 2 WITH COMPLICATIONS, UNSPECIFIED WHETHER LONG TERM INSULIN USE (HCC): ICD-10-CM

## 2025-08-06 DIAGNOSIS — K76.0 HEPATIC STEATOSIS: ICD-10-CM

## 2025-08-06 DIAGNOSIS — E66.9 OBESITY, UNSPECIFIED CLASS, UNSPECIFIED OBESITY TYPE, UNSPECIFIED WHETHER SERIOUS COMORBIDITY PRESENT: Primary | ICD-10-CM

## 2025-08-11 ENCOUNTER — HOSPITAL ENCOUNTER (OUTPATIENT)
Facility: HOSPITAL | Age: 70
Discharge: HOME | End: 2025-08-14
Payer: MEDICARE

## 2025-08-11 DIAGNOSIS — E11.8 CONTROLLED DIABETES MELLITUS TYPE 2 WITH COMPLICATIONS, UNSPECIFIED WHETHER LONG TERM INSULIN USE (HCC): ICD-10-CM

## 2025-08-11 DIAGNOSIS — K76.0 HEPATIC STEATOSIS: ICD-10-CM

## 2025-08-11 DIAGNOSIS — E66.9 OBESITY, UNSPECIFIED CLASS, UNSPECIFIED OBESITY TYPE, UNSPECIFIED WHETHER SERIOUS COMORBIDITY PRESENT: ICD-10-CM

## 2025-08-11 PROCEDURE — 76981 USE PARENCHYMA: CPT

## 2025-08-31 ENCOUNTER — APPOINTMENT (OUTPATIENT)
Facility: HOSPITAL | Age: 70
End: 2025-08-31
Payer: MEDICARE

## 2025-08-31 ENCOUNTER — HOSPITAL ENCOUNTER (EMERGENCY)
Facility: HOSPITAL | Age: 70
Discharge: HOME | End: 2025-08-31
Payer: MEDICARE

## 2025-08-31 VITALS
TEMPERATURE: 97.6 F | HEART RATE: 62 BPM | BODY MASS INDEX: 30.66 KG/M2 | SYSTOLIC BLOOD PRESSURE: 178 MMHG | HEIGHT: 69 IN | OXYGEN SATURATION: 98 % | RESPIRATION RATE: 17 BRPM | DIASTOLIC BLOOD PRESSURE: 80 MMHG | WEIGHT: 207 LBS

## 2025-08-31 DIAGNOSIS — S01.01XA LACERATION OF SCALP, INITIAL ENCOUNTER: Primary | ICD-10-CM
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
## 2025-08-31 DIAGNOSIS — S09.90XA INJURY OF HEAD, INITIAL ENCOUNTER: ICD-10-CM

## 2025-08-31 LAB
ALBUMIN SERPL-MCNC: 3.7 G/DL (ref 3.5–5)
ALBUMIN/GLOB SERPL: 1.1 (ref 1.1–2.2)
ALP SERPL-CCNC: 127 U/L (ref 45–117)
ALT SERPL-CCNC: 21 U/L (ref 12–78)
ANION GAP SERPL CALC-SCNC: 7 MMOL/L (ref 2–12)
AST SERPL W P-5'-P-CCNC: 11 U/L (ref 15–37)
BILIRUB SERPL-MCNC: 0.3 MG/DL (ref 0.2–1)
BUN SERPL-MCNC: 13 MG/DL (ref 6–20)
BUN/CREAT SERPL: 25 (ref 12–20)
CA-I BLD-MCNC: 9.5 MG/DL (ref 8.5–10.1)
CHLORIDE SERPL-SCNC: 105 MMOL/L (ref 97–108)
CO2 SERPL-SCNC: 29 MMOL/L (ref 21–32)
CREAT SERPL-MCNC: 0.51 MG/DL (ref 0.55–1.02)
ERYTHROCYTE [DISTWIDTH] IN BLOOD BY AUTOMATED COUNT: 14.1 % (ref 11.5–14.5)
GLOBULIN SER CALC-MCNC: 3.4 G/DL (ref 2–4)
GLUCOSE SERPL-MCNC: 97 MG/DL (ref 65–100)
HCT VFR BLD AUTO: 40.1 % (ref 35–47)
HGB BLD-MCNC: 13.5 G/DL (ref 11.5–16)
INR PPP: 1.2 (ref 0.9–1.1)
MCH RBC QN AUTO: 29.2 PG (ref 26–34)
MCHC RBC AUTO-ENTMCNC: 33.7 G/DL (ref 30–36.5)
MCV RBC AUTO: 86.8 FL (ref 80–99)
NRBC # BLD: 0 K/UL (ref 0–0.01)
NRBC BLD-RTO: 0 PER 100 WBC
PLATELET # BLD AUTO: 270 K/UL (ref 150–400)
PMV BLD AUTO: 9.5 FL (ref 8.9–12.9)
POTASSIUM SERPL-SCNC: 3.9 MMOL/L (ref 3.5–5.1)
PROT SERPL-MCNC: 7.1 G/DL (ref 6.4–8.2)
PROTHROMBIN TIME: 15.5 SEC (ref 11.9–14.1)
RBC # BLD AUTO: 4.62 M/UL (ref 3.8–5.2)
SODIUM SERPL-SCNC: 141 MMOL/L (ref 136–145)
TROPONIN I SERPL HS-MCNC: 6 NG/L (ref 0–51)
WBC # BLD AUTO: 7.3 K/UL (ref 3.6–11)

## 2025-08-31 PROCEDURE — 85610 PROTHROMBIN TIME: CPT

## 2025-08-31 PROCEDURE — 6370000000 HC RX 637 (ALT 250 FOR IP)

## 2025-08-31 PROCEDURE — 12001 RPR S/N/AX/GEN/TRNK 2.5CM/<: CPT

## 2025-08-31 PROCEDURE — 99285 EMERGENCY DEPT VISIT HI MDM: CPT

## 2025-08-31 PROCEDURE — 85027 COMPLETE CBC AUTOMATED: CPT

## 2025-08-31 PROCEDURE — 90471 IMMUNIZATION ADMIN: CPT

## 2025-08-31 PROCEDURE — 84484 ASSAY OF TROPONIN QUANT: CPT

## 2025-08-31 PROCEDURE — 36415 COLL VENOUS BLD VENIPUNCTURE: CPT

## 2025-08-31 PROCEDURE — 93005 ELECTROCARDIOGRAM TRACING: CPT

## 2025-08-31 PROCEDURE — 80053 COMPREHEN METABOLIC PANEL: CPT

## 2025-08-31 PROCEDURE — 90714 TD VACC NO PRESV 7 YRS+ IM: CPT

## 2025-08-31 PROCEDURE — 6360000002 HC RX W HCPCS

## 2025-08-31 PROCEDURE — 71045 X-RAY EXAM CHEST 1 VIEW: CPT

## 2025-08-31 PROCEDURE — 70450 CT HEAD/BRAIN W/O DYE: CPT

## 2025-08-31 RX ORDER — BACITRACIN ZINC 500 [USP'U]/G
OINTMENT TOPICAL ONCE
Status: COMPLETED | OUTPATIENT
Start: 2025-08-31 | End: 2025-08-31

## 2025-08-31 RX ORDER — LIDOCAINE HYDROCHLORIDE AND EPINEPHRINE 10; 10 MG/ML; UG/ML
20 INJECTION, SOLUTION INFILTRATION; PERINEURAL ONCE
Status: COMPLETED | OUTPATIENT
Start: 2025-08-31 | End: 2025-08-31

## 2025-08-31 RX ADMIN — CLOSTRIDIUM TETANI TOXOID ANTIGEN (FORMALDEHYDE INACTIVATED) AND CORYNEBACTERIUM DIPHTHERIAE TOXOID ANTIGEN (FORMALDEHYDE INACTIVATED) 0.5 ML: 5; 2 INJECTION, SUSPENSION INTRAMUSCULAR at 19:27

## 2025-08-31 RX ADMIN — BACITRACIN ZINC: 500 OINTMENT TOPICAL at 20:04

## 2025-08-31 RX ADMIN — LIDOCAINE HYDROCHLORIDE,EPINEPHRINE BITARTRATE 20 ML: 10; .01 INJECTION, SOLUTION INFILTRATION; PERINEURAL at 19:30

## 2025-08-31 ASSESSMENT — LIFESTYLE VARIABLES
HOW OFTEN DO YOU HAVE A DRINK CONTAINING ALCOHOL: NEVER
HOW MANY STANDARD DRINKS CONTAINING ALCOHOL DO YOU HAVE ON A TYPICAL DAY: PATIENT DOES NOT DRINK

## 2025-08-31 ASSESSMENT — PAIN - FUNCTIONAL ASSESSMENT: PAIN_FUNCTIONAL_ASSESSMENT: 0-10

## 2025-08-31 ASSESSMENT — PAIN SCALES - GENERAL: PAINLEVEL_OUTOF10: 7

## 2025-09-01 LAB
EKG ATRIAL RATE: 58 BPM
EKG DIAGNOSIS: NORMAL
EKG P AXIS: 37 DEGREES
EKG P-R INTERVAL: 170 MS
EKG Q-T INTERVAL: 414 MS
EKG QRS DURATION: 80 MS
EKG QTC CALCULATION (BAZETT): 406 MS
EKG R AXIS: 35 DEGREES
EKG T AXIS: 70 DEGREES
EKG VENTRICULAR RATE: 58 BPM